# Patient Record
Sex: FEMALE | Race: WHITE | NOT HISPANIC OR LATINO | Employment: FULL TIME | URBAN - METROPOLITAN AREA
[De-identification: names, ages, dates, MRNs, and addresses within clinical notes are randomized per-mention and may not be internally consistent; named-entity substitution may affect disease eponyms.]

---

## 2023-09-07 ENCOUNTER — HOSPITAL ENCOUNTER (OUTPATIENT)
Dept: DATA CONVERSION | Facility: HOSPITAL | Age: 33
Discharge: HOME | End: 2023-09-07
Payer: COMMERCIAL

## 2023-09-07 DIAGNOSIS — Z11.3 ENCOUNTER FOR SCREENING FOR INFECTIONS WITH A PREDOMINANTLY SEXUAL MODE OF TRANSMISSION: ICD-10-CM

## 2023-09-07 DIAGNOSIS — Z34.81 ENCOUNTER FOR SUPERVISION OF OTHER NORMAL PREGNANCY, FIRST TRIMESTER (HHS-HCC): ICD-10-CM

## 2023-09-07 LAB
C TRACH RRNA SPEC QL NAA+PROBE: NORMAL
DRUG SCREEN COMMENT UR-IMP: NORMAL
N GONORRHOEA RRNA SPEC QL NAA+PROBE: NORMAL
SPECIMEN SOURCE: NORMAL

## 2023-10-13 ENCOUNTER — LAB (OUTPATIENT)
Dept: LAB | Facility: LAB | Age: 33
End: 2023-10-13
Payer: COMMERCIAL

## 2023-10-13 DIAGNOSIS — Z13.79 ENCOUNTER FOR OTHER SCREENING FOR GENETIC AND CHROMOSOMAL ANOMALIES: Primary | ICD-10-CM

## 2023-10-13 DIAGNOSIS — Z11.3 ENCOUNTER FOR SCREENING FOR INFECTIONS WITH A PREDOMINANTLY SEXUAL MODE OF TRANSMISSION: ICD-10-CM

## 2023-10-13 DIAGNOSIS — Z34.81 ENCOUNTER FOR SUPERVISION OF OTHER NORMAL PREGNANCY, FIRST TRIMESTER (HHS-HCC): ICD-10-CM

## 2023-10-13 LAB
ABO GROUP (TYPE) IN BLOOD: NORMAL
ANTIBODY SCREEN: NORMAL
ERYTHROCYTE [DISTWIDTH] IN BLOOD BY AUTOMATED COUNT: 12.9 % (ref 11.5–14.5)
EST. AVERAGE GLUCOSE BLD GHB EST-MCNC: 94 MG/DL
HBA1C MFR BLD: 4.9 %
HCT VFR BLD AUTO: 40.2 % (ref 36–46)
HGB BLD-MCNC: 12.8 G/DL (ref 12–16)
MCH RBC QN AUTO: 30.2 PG (ref 26–34)
MCHC RBC AUTO-ENTMCNC: 31.8 G/DL (ref 32–36)
MCV RBC AUTO: 95 FL (ref 80–100)
NRBC BLD-RTO: 0 /100 WBCS (ref 0–0)
PLATELET # BLD AUTO: 295 X10*3/UL (ref 150–450)
PMV BLD AUTO: 9.9 FL (ref 7.5–11.5)
RBC # BLD AUTO: 4.24 X10*6/UL (ref 4–5.2)
RH FACTOR (ANTIGEN D): NORMAL
WBC # BLD AUTO: 7 X10*3/UL (ref 4.4–11.3)

## 2023-10-13 PROCEDURE — 86317 IMMUNOASSAY INFECTIOUS AGENT: CPT

## 2023-10-13 PROCEDURE — 83036 HEMOGLOBIN GLYCOSYLATED A1C: CPT

## 2023-10-13 PROCEDURE — 85027 COMPLETE CBC AUTOMATED: CPT

## 2023-10-13 PROCEDURE — 86780 TREPONEMA PALLIDUM: CPT

## 2023-10-13 PROCEDURE — 36415 COLL VENOUS BLD VENIPUNCTURE: CPT

## 2023-10-13 PROCEDURE — 86850 RBC ANTIBODY SCREEN: CPT

## 2023-10-13 PROCEDURE — 87340 HEPATITIS B SURFACE AG IA: CPT

## 2023-10-13 PROCEDURE — 87389 HIV-1 AG W/HIV-1&-2 AB AG IA: CPT

## 2023-10-13 PROCEDURE — 86900 BLOOD TYPING SEROLOGIC ABO: CPT

## 2023-10-13 PROCEDURE — 86901 BLOOD TYPING SEROLOGIC RH(D): CPT

## 2023-10-13 PROCEDURE — 86803 HEPATITIS C AB TEST: CPT

## 2023-10-14 LAB
HBV SURFACE AG SERPL QL IA: NONREACTIVE
HCV AB SER QL: NONREACTIVE
HIV 1+2 AB+HIV1 P24 AG SERPL QL IA: NONREACTIVE
RUBV IGG SERPL IA-ACNC: 1.3 IA
RUBV IGG SERPL QL IA: POSITIVE
T PALLIDUM AB SER QL: NONREACTIVE

## 2023-12-30 ENCOUNTER — LAB (OUTPATIENT)
Dept: LAB | Facility: LAB | Age: 33
End: 2023-12-30
Payer: COMMERCIAL

## 2023-12-30 DIAGNOSIS — Z34.82 ENCOUNTER FOR SUPERVISION OF OTHER NORMAL PREGNANCY, SECOND TRIMESTER (HHS-HCC): Primary | ICD-10-CM

## 2023-12-30 LAB
ERYTHROCYTE [DISTWIDTH] IN BLOOD BY AUTOMATED COUNT: 13.2 % (ref 11.5–14.5)
GLUCOSE 1H P 50 G GLC PO SERPL-MCNC: 152 MG/DL
HCT VFR BLD AUTO: 33.3 % (ref 36–46)
HGB BLD-MCNC: 10.2 G/DL (ref 12–16)
MCH RBC QN AUTO: 26.6 PG (ref 26–34)
MCHC RBC AUTO-ENTMCNC: 30.6 G/DL (ref 32–36)
MCV RBC AUTO: 87 FL (ref 80–100)
NRBC BLD-RTO: 0 /100 WBCS (ref 0–0)
PLATELET # BLD AUTO: 254 X10*3/UL (ref 150–450)
RBC # BLD AUTO: 3.83 X10*6/UL (ref 4–5.2)
WBC # BLD AUTO: 8.1 X10*3/UL (ref 4.4–11.3)

## 2023-12-30 PROCEDURE — 85027 COMPLETE CBC AUTOMATED: CPT

## 2023-12-30 PROCEDURE — 36415 COLL VENOUS BLD VENIPUNCTURE: CPT

## 2023-12-30 PROCEDURE — 82947 ASSAY GLUCOSE BLOOD QUANT: CPT

## 2024-01-05 DIAGNOSIS — R63.0 ANOREXIA: ICD-10-CM

## 2024-01-05 DIAGNOSIS — E86.0 DEHYDRATION: ICD-10-CM

## 2024-01-05 DIAGNOSIS — Z34.83 ENCOUNTER FOR SUPERVISION OF OTHER NORMAL PREGNANCY, THIRD TRIMESTER (HHS-HCC): Primary | ICD-10-CM

## 2024-01-06 ENCOUNTER — LAB (OUTPATIENT)
Dept: LAB | Facility: LAB | Age: 34
End: 2024-01-06
Payer: COMMERCIAL

## 2024-01-06 DIAGNOSIS — Z34.83 ENCOUNTER FOR SUPERVISION OF OTHER NORMAL PREGNANCY, THIRD TRIMESTER (HHS-HCC): ICD-10-CM

## 2024-01-06 DIAGNOSIS — R63.0 ANOREXIA: ICD-10-CM

## 2024-01-06 DIAGNOSIS — E86.0 DEHYDRATION: ICD-10-CM

## 2024-01-06 LAB
ALBUMIN SERPL BCP-MCNC: 3.4 G/DL (ref 3.4–5)
ALP SERPL-CCNC: 84 U/L (ref 33–110)
ALT SERPL W P-5'-P-CCNC: 8 U/L (ref 7–45)
AMYLASE SERPL-CCNC: 39 U/L (ref 29–103)
ANION GAP SERPL CALC-SCNC: 14 MMOL/L (ref 10–20)
AST SERPL W P-5'-P-CCNC: 12 U/L (ref 9–39)
BILIRUB SERPL-MCNC: 0.3 MG/DL (ref 0–1.2)
BUN SERPL-MCNC: 3 MG/DL (ref 6–23)
CALCIUM SERPL-MCNC: 8.5 MG/DL (ref 8.6–10.3)
CHLORIDE SERPL-SCNC: 104 MMOL/L (ref 98–107)
CO2 SERPL-SCNC: 25 MMOL/L (ref 21–32)
CREAT SERPL-MCNC: 0.47 MG/DL (ref 0.5–1.05)
GFR SERPL CREATININE-BSD FRML MDRD: >90 ML/MIN/1.73M*2
GLUCOSE 1H P 100 G GLC PO SERPL-MCNC: 158 MG/DL
GLUCOSE 2H P 100 G GLC PO SERPL-MCNC: 139 MG/DL
GLUCOSE 3H P 100 G GLC PO SERPL-MCNC: 110 MG/DL
GLUCOSE P FAST SERPL-MCNC: 90 MG/DL
GLUCOSE SERPL-MCNC: 90 MG/DL (ref 74–99)
LIPASE SERPL-CCNC: 18 U/L (ref 9–82)
POTASSIUM SERPL-SCNC: 3.8 MMOL/L (ref 3.5–5.3)
PROT SERPL-MCNC: 5.9 G/DL (ref 6.4–8.2)
SODIUM SERPL-SCNC: 139 MMOL/L (ref 136–145)
TSH SERPL-ACNC: 1.07 MIU/L (ref 0.44–3.98)

## 2024-01-06 PROCEDURE — 80053 COMPREHEN METABOLIC PANEL: CPT

## 2024-01-06 PROCEDURE — 82951 GLUCOSE TOLERANCE TEST (GTT): CPT

## 2024-01-06 PROCEDURE — 82947 ASSAY GLUCOSE BLOOD QUANT: CPT

## 2024-01-06 PROCEDURE — 82952 GTT-ADDED SAMPLES: CPT

## 2024-01-06 PROCEDURE — 82950 GLUCOSE TEST: CPT

## 2024-01-06 PROCEDURE — 36415 COLL VENOUS BLD VENIPUNCTURE: CPT

## 2024-01-06 PROCEDURE — 82150 ASSAY OF AMYLASE: CPT

## 2024-01-06 PROCEDURE — 84443 ASSAY THYROID STIM HORMONE: CPT

## 2024-01-06 PROCEDURE — 83690 ASSAY OF LIPASE: CPT

## 2024-01-15 ENCOUNTER — ANCILLARY PROCEDURE (OUTPATIENT)
Dept: RADIOLOGY | Facility: CLINIC | Age: 34
End: 2024-01-15
Payer: COMMERCIAL

## 2024-01-15 DIAGNOSIS — R10.11 RIGHT UPPER QUADRANT PAIN: ICD-10-CM

## 2024-01-15 PROCEDURE — 76705 ECHO EXAM OF ABDOMEN: CPT

## 2024-02-29 ENCOUNTER — HOSPITAL ENCOUNTER (INPATIENT)
Facility: HOSPITAL | Age: 34
End: 2024-02-29
Attending: OBSTETRICS & GYNECOLOGY | Admitting: OBSTETRICS & GYNECOLOGY
Payer: COMMERCIAL

## 2024-02-29 ENCOUNTER — HOSPITAL ENCOUNTER (INPATIENT)
Facility: HOSPITAL | Age: 34
LOS: 3 days | Discharge: HOME | End: 2024-03-03
Attending: STUDENT IN AN ORGANIZED HEALTH CARE EDUCATION/TRAINING PROGRAM | Admitting: STUDENT IN AN ORGANIZED HEALTH CARE EDUCATION/TRAINING PROGRAM
Payer: COMMERCIAL

## 2024-02-29 ENCOUNTER — HOSPITAL ENCOUNTER (OUTPATIENT)
Facility: HOSPITAL | Age: 34
Discharge: HOME | End: 2024-02-29
Attending: STUDENT IN AN ORGANIZED HEALTH CARE EDUCATION/TRAINING PROGRAM | Admitting: STUDENT IN AN ORGANIZED HEALTH CARE EDUCATION/TRAINING PROGRAM
Payer: COMMERCIAL

## 2024-02-29 VITALS
DIASTOLIC BLOOD PRESSURE: 76 MMHG | TEMPERATURE: 98.2 F | WEIGHT: 160 LBS | SYSTOLIC BLOOD PRESSURE: 131 MMHG | HEART RATE: 130 BPM | RESPIRATION RATE: 18 BRPM | OXYGEN SATURATION: 97 % | HEIGHT: 59 IN | BODY MASS INDEX: 32.25 KG/M2

## 2024-02-29 DIAGNOSIS — M79.89 LEG SWELLING: Primary | ICD-10-CM

## 2024-02-29 DIAGNOSIS — O12.00: ICD-10-CM

## 2024-02-29 LAB
ABO GROUP (TYPE) IN BLOOD: NORMAL
ANTIBODY SCREEN: NORMAL
ERYTHROCYTE [DISTWIDTH] IN BLOOD BY AUTOMATED COUNT: 16.4 % (ref 11.5–14.5)
HCT VFR BLD AUTO: 29.7 % (ref 36–46)
HGB BLD-MCNC: 9 G/DL (ref 12–16)
HOLD SPECIMEN: NORMAL
MCH RBC QN AUTO: 22 PG (ref 26–34)
MCHC RBC AUTO-ENTMCNC: 30.3 G/DL (ref 32–36)
MCV RBC AUTO: 72 FL (ref 80–100)
NRBC BLD-RTO: 0 /100 WBCS (ref 0–0)
PLATELET # BLD AUTO: 218 X10*3/UL (ref 150–450)
RBC # BLD AUTO: 4.1 X10*6/UL (ref 4–5.2)
RH FACTOR (ANTIGEN D): NORMAL
WBC # BLD AUTO: 9.9 X10*3/UL (ref 4.4–11.3)

## 2024-02-29 PROCEDURE — 86780 TREPONEMA PALLIDUM: CPT | Mod: TRILAB,WESLAB | Performed by: STUDENT IN AN ORGANIZED HEALTH CARE EDUCATION/TRAINING PROGRAM

## 2024-02-29 PROCEDURE — 99213 OFFICE O/P EST LOW 20 MIN: CPT

## 2024-02-29 PROCEDURE — 87086 URINE CULTURE/COLONY COUNT: CPT | Mod: TRILAB,WESLAB | Performed by: STUDENT IN AN ORGANIZED HEALTH CARE EDUCATION/TRAINING PROGRAM

## 2024-02-29 PROCEDURE — 86901 BLOOD TYPING SEROLOGIC RH(D): CPT | Performed by: STUDENT IN AN ORGANIZED HEALTH CARE EDUCATION/TRAINING PROGRAM

## 2024-02-29 PROCEDURE — 2500000004 HC RX 250 GENERAL PHARMACY W/ HCPCS (ALT 636 FOR OP/ED): Performed by: STUDENT IN AN ORGANIZED HEALTH CARE EDUCATION/TRAINING PROGRAM

## 2024-02-29 PROCEDURE — 85027 COMPLETE CBC AUTOMATED: CPT | Performed by: STUDENT IN AN ORGANIZED HEALTH CARE EDUCATION/TRAINING PROGRAM

## 2024-02-29 PROCEDURE — 1220000001 HC OB SEMI-PRIVATE ROOM DAILY

## 2024-02-29 PROCEDURE — 87081 CULTURE SCREEN ONLY: CPT | Mod: TRILAB,WESLAB | Performed by: STUDENT IN AN ORGANIZED HEALTH CARE EDUCATION/TRAINING PROGRAM

## 2024-02-29 PROCEDURE — 36415 COLL VENOUS BLD VENIPUNCTURE: CPT | Performed by: STUDENT IN AN ORGANIZED HEALTH CARE EDUCATION/TRAINING PROGRAM

## 2024-02-29 PROCEDURE — 36415 COLL VENOUS BLD VENIPUNCTURE: CPT

## 2024-02-29 PROCEDURE — 99215 OFFICE O/P EST HI 40 MIN: CPT

## 2024-02-29 RX ORDER — METOCLOPRAMIDE 10 MG/1
10 TABLET ORAL EVERY 6 HOURS PRN
Status: DISCONTINUED | OUTPATIENT
Start: 2024-02-29 | End: 2024-03-01

## 2024-02-29 RX ORDER — ONDANSETRON HYDROCHLORIDE 2 MG/ML
4 INJECTION, SOLUTION INTRAVENOUS EVERY 6 HOURS PRN
Status: DISCONTINUED | OUTPATIENT
Start: 2024-02-29 | End: 2024-02-29 | Stop reason: HOSPADM

## 2024-02-29 RX ORDER — METOCLOPRAMIDE HYDROCHLORIDE 5 MG/ML
10 INJECTION INTRAMUSCULAR; INTRAVENOUS EVERY 6 HOURS PRN
Status: DISCONTINUED | OUTPATIENT
Start: 2024-02-29 | End: 2024-03-01

## 2024-02-29 RX ORDER — SODIUM CHLORIDE, SODIUM LACTATE, POTASSIUM CHLORIDE, CALCIUM CHLORIDE 600; 310; 30; 20 MG/100ML; MG/100ML; MG/100ML; MG/100ML
125 INJECTION, SOLUTION INTRAVENOUS CONTINUOUS
Status: DISCONTINUED | OUTPATIENT
Start: 2024-02-29 | End: 2024-03-01

## 2024-02-29 RX ORDER — LIDOCAINE HYDROCHLORIDE 10 MG/ML
30 INJECTION INFILTRATION; PERINEURAL ONCE AS NEEDED
Status: DISCONTINUED | OUTPATIENT
Start: 2024-02-29 | End: 2024-03-01 | Stop reason: HOSPADM

## 2024-02-29 RX ORDER — OXYTOCIN 10 [USP'U]/ML
10 INJECTION, SOLUTION INTRAMUSCULAR; INTRAVENOUS ONCE AS NEEDED
Status: DISCONTINUED | OUTPATIENT
Start: 2024-02-29 | End: 2024-03-01 | Stop reason: HOSPADM

## 2024-02-29 RX ORDER — LOPERAMIDE HYDROCHLORIDE 2 MG/1
4 CAPSULE ORAL EVERY 2 HOUR PRN
Status: DISCONTINUED | OUTPATIENT
Start: 2024-02-29 | End: 2024-03-01 | Stop reason: HOSPADM

## 2024-02-29 RX ORDER — FOLIC ACID 1 MG/1
TABLET ORAL DAILY
COMMUNITY

## 2024-02-29 RX ORDER — LIDOCAINE HYDROCHLORIDE 10 MG/ML
0.5 INJECTION INFILTRATION; PERINEURAL ONCE AS NEEDED
Status: DISCONTINUED | OUTPATIENT
Start: 2024-02-29 | End: 2024-02-29 | Stop reason: HOSPADM

## 2024-02-29 RX ORDER — ONDANSETRON 4 MG/1
4 TABLET, FILM COATED ORAL EVERY 6 HOURS PRN
Status: DISCONTINUED | OUTPATIENT
Start: 2024-02-29 | End: 2024-03-01

## 2024-02-29 RX ORDER — OXYTOCIN/0.9 % SODIUM CHLORIDE 30/500 ML
60 PLASTIC BAG, INJECTION (ML) INTRAVENOUS ONCE AS NEEDED
Status: COMPLETED | OUTPATIENT
Start: 2024-02-29 | End: 2024-03-01

## 2024-02-29 RX ORDER — MISOPROSTOL 200 UG/1
800 TABLET ORAL ONCE AS NEEDED
Status: DISCONTINUED | OUTPATIENT
Start: 2024-02-29 | End: 2024-03-01 | Stop reason: HOSPADM

## 2024-02-29 RX ORDER — PENICILLIN G 3000000 [IU]/50ML
3 INJECTION, SOLUTION INTRAVENOUS EVERY 4 HOURS
Status: DISCONTINUED | OUTPATIENT
Start: 2024-03-01 | End: 2024-03-01

## 2024-02-29 RX ORDER — ONDANSETRON HYDROCHLORIDE 2 MG/ML
4 INJECTION, SOLUTION INTRAVENOUS EVERY 6 HOURS PRN
Status: DISCONTINUED | OUTPATIENT
Start: 2024-02-29 | End: 2024-03-01

## 2024-02-29 RX ORDER — NITROFURANTOIN 25; 75 MG/1; MG/1
CAPSULE ORAL 2 TIMES DAILY
COMMUNITY

## 2024-02-29 RX ORDER — HYDRALAZINE HYDROCHLORIDE 20 MG/ML
5 INJECTION INTRAMUSCULAR; INTRAVENOUS ONCE AS NEEDED
Status: DISCONTINUED | OUTPATIENT
Start: 2024-02-29 | End: 2024-03-01 | Stop reason: HOSPADM

## 2024-02-29 RX ORDER — OMEPRAZOLE 40 MG/1
40 CAPSULE, DELAYED RELEASE ORAL
COMMUNITY

## 2024-02-29 RX ORDER — NIFEDIPINE 10 MG/1
10 CAPSULE ORAL ONCE AS NEEDED
Status: DISCONTINUED | OUTPATIENT
Start: 2024-02-29 | End: 2024-02-29 | Stop reason: HOSPADM

## 2024-02-29 RX ORDER — GABAPENTIN 400 MG/1
400 CAPSULE ORAL DAILY
COMMUNITY

## 2024-02-29 RX ORDER — ONDANSETRON 4 MG/1
4 TABLET, FILM COATED ORAL EVERY 6 HOURS PRN
Status: DISCONTINUED | OUTPATIENT
Start: 2024-02-29 | End: 2024-02-29 | Stop reason: HOSPADM

## 2024-02-29 RX ORDER — METHYLERGONOVINE MALEATE 0.2 MG/ML
0.2 INJECTION INTRAVENOUS ONCE AS NEEDED
Status: DISCONTINUED | OUTPATIENT
Start: 2024-02-29 | End: 2024-03-01 | Stop reason: HOSPADM

## 2024-02-29 RX ORDER — LABETALOL HYDROCHLORIDE 5 MG/ML
20 INJECTION, SOLUTION INTRAVENOUS ONCE AS NEEDED
Status: DISCONTINUED | OUTPATIENT
Start: 2024-02-29 | End: 2024-02-29 | Stop reason: HOSPADM

## 2024-02-29 RX ORDER — TERBUTALINE SULFATE 1 MG/ML
0.25 INJECTION SUBCUTANEOUS ONCE AS NEEDED
Status: DISCONTINUED | OUTPATIENT
Start: 2024-02-29 | End: 2024-03-01 | Stop reason: HOSPADM

## 2024-02-29 RX ORDER — NIFEDIPINE 10 MG/1
10 CAPSULE ORAL ONCE AS NEEDED
Status: DISCONTINUED | OUTPATIENT
Start: 2024-02-29 | End: 2024-03-01 | Stop reason: HOSPADM

## 2024-02-29 RX ORDER — CARBOPROST TROMETHAMINE 250 UG/ML
250 INJECTION, SOLUTION INTRAMUSCULAR ONCE AS NEEDED
Status: DISCONTINUED | OUTPATIENT
Start: 2024-02-29 | End: 2024-03-01 | Stop reason: HOSPADM

## 2024-02-29 RX ORDER — HYDRALAZINE HYDROCHLORIDE 20 MG/ML
5 INJECTION INTRAMUSCULAR; INTRAVENOUS ONCE AS NEEDED
Status: DISCONTINUED | OUTPATIENT
Start: 2024-02-29 | End: 2024-02-29 | Stop reason: HOSPADM

## 2024-02-29 RX ORDER — LABETALOL HYDROCHLORIDE 5 MG/ML
20 INJECTION, SOLUTION INTRAVENOUS ONCE AS NEEDED
Status: DISCONTINUED | OUTPATIENT
Start: 2024-02-29 | End: 2024-03-01 | Stop reason: HOSPADM

## 2024-02-29 RX ORDER — TRANEXAMIC ACID 100 MG/ML
1000 INJECTION, SOLUTION INTRAVENOUS ONCE AS NEEDED
Status: DISCONTINUED | OUTPATIENT
Start: 2024-02-29 | End: 2024-03-01 | Stop reason: HOSPADM

## 2024-02-29 RX ADMIN — DEXTROSE MONOHYDRATE 5 MILLION UNITS: 5 INJECTION INTRAVENOUS at 22:56

## 2024-02-29 ASSESSMENT — ACTIVITIES OF DAILY LIVING (ADL)
WALKS_IN_HOME: INDEPENDENTLY
ADL_BEFORE_ADMISSION: INDEPENDENTLY
HOW_WELL_CAN_YOU_BATHE_YOURSELF: INDEPENDENTLY
ADEQUATE_TO_COMPLETE_ADL: YES
HOW_WELL_CAN_YOU_DRESS_YOURSELF: INDEPENDENTLY
TOILETING: INDEPENDENT
ADEQUATE_TO_COMPLETE_ADL: YES
HEARING_RIGHT_EAR: NO PROBLEMS
HOW_WELL_CAN_YOU_USE_BATHROOM_BY_YOURSELF: INDEPENDENTLY
HOW_WELL_CAN_YOU_FEED_YOURSELF: INDEPENDENTLY
HOW_WELL_CAN_YOU_COMPLETE_GROOMING_TASKS: INDEPENDENTLY
FEEDING: INDEPENDENT
BATHING: INDEPENDENT
ADL_BEFORE_ADMISSION: RIGHT
DRESSING: INDEPENDENT
HEARING_LEFT_EAR: NO PROBLEMS

## 2024-02-29 ASSESSMENT — PAIN SCALES - GENERAL
PAINLEVEL_OUTOF10: 4
PAINLEVEL_OUTOF10: 5 - MODERATE PAIN
PAINLEVEL_OUTOF10: 0 - NO PAIN
PAINLEVEL_OUTOF10: 5 - MODERATE PAIN

## 2024-02-29 NOTE — H&P
"Obstetrical Admission History and Physical     Shirley Arizmendi is a 33 y.o. . At 34+2 c/o LOF    Chief Complaint: Rupture of Membranes (Pt arrives to labor and delivery in triage bed 2, states \"I felt a gush of fluid about 20 minutes ago\". Pt denies any vaginal bleeding. Pt reports positive fetal movement. Pt states \"I started having back pain and some cramping 4/10 about 20 minutes ago\". )    Assessment/Plan    D/w pt no s/sx of SROM on exam.   No symptoms of UTI but will send urine culture due to leukocytes on urine sample.  D/w pt s/sx of labor and srom.  Okay for discharge home but pt aware to call back if any changes in status    Active Problems:  There are no active Hospital Problems.      Pregnancy Problems (from 22 to present)       No problems associated with this episode.          Subjective   Shirley is here complaining of LOF.  Had a large gush, no leaking since. Good FM.  No regular contractions but lower back pain.   No change in urination.  No VB     Obstetrical History   OB History    Para Term  AB Living   6 5 5     5   SAB IAB Ectopic Multiple Live Births           5      # Outcome Date GA Lbr Juan C/2nd Weight Sex Delivery Anes PTL Lv   6 Current            5 Term            4 Term            3 Term            2 Term            1 Term                Past Medical History  Past Medical History:   Diagnosis Date    Other conditions influencing health status     No significant past surgical history        Past Surgical History   No past surgical history on file.    Social History  Social History     Tobacco Use    Smoking status: Not on file    Smokeless tobacco: Not on file   Substance Use Topics    Alcohol use: Not on file     Substance and Sexual Activity   Drug Use Not on file       Allergies  Latex, natural rubber and Sulfa (sulfonamide antibiotics)     Medications  Medications Prior to Admission   Medication Sig Dispense Refill Last Dose    folic acid (Folvite) 1 mg tablet " Take by mouth once daily.    at 0930    gabapentin (Neurontin) 400 mg capsule Take 1 capsule (400 mg) by mouth 3 times a day.   2/29/2024 at 0930    levomilnacipran (Fetzima) 120 mg extended release capsule Take 1 capsule (120 mg) by mouth once daily.   2/29/2024 at 0930    nitrofurantoin, macrocrystal-monohydrate, (Macrobid) 100 mg capsule Take by mouth 2 times a day.   2/29/2024 at 0930    omeprazole (PriLOSEC) 40 mg DR capsule Take 1 capsule (40 mg) by mouth. Do not crush or chew.    at 0930       Objective    Last Vitals  Temp Pulse Resp BP MAP O2 Sat   36.8 °C (98.2 °F) (!) 130 18 131/76   97 %     Physical Examination  FHR is 150 , with  , and a   tracing.    [facility] reading:  irritability  CERVIX: closed/long/high, no pooling, neg nitrazine, neg ferning    Lab Review  Labs in chart were reviewed.

## 2024-03-01 ENCOUNTER — ANESTHESIA EVENT (OUTPATIENT)
Dept: OBSTETRICS AND GYNECOLOGY | Facility: HOSPITAL | Age: 34
End: 2024-03-01
Payer: COMMERCIAL

## 2024-03-01 ENCOUNTER — ANESTHESIA (OUTPATIENT)
Dept: OBSTETRICS AND GYNECOLOGY | Facility: HOSPITAL | Age: 34
End: 2024-03-01
Payer: COMMERCIAL

## 2024-03-01 PROBLEM — F41.9 ANXIETY: Status: ACTIVE | Noted: 2024-03-01

## 2024-03-01 PROBLEM — F32.A DEPRESSION: Status: ACTIVE | Noted: 2024-03-01

## 2024-03-01 PROBLEM — Z86.69 HISTORY OF MIGRAINE HEADACHES: Status: ACTIVE | Noted: 2024-03-01

## 2024-03-01 LAB
ABO GROUP (TYPE) IN BLOOD: NORMAL
ABO GROUP (TYPE) IN BLOOD: NORMAL
ANTIBODY SCREEN: NORMAL
BACTERIA UR CULT: NO GROWTH
BASE EXCESS BLDCOA CALC-SCNC: -6 MMOL/L (ref -10.8–-0.5)
BASE EXCESS BLDCOV CALC-SCNC: -3.9 MMOL/L (ref -8.1–-0.5)
BODY TEMPERATURE: 37 DEGREES CELSIUS
BODY TEMPERATURE: 37 DEGREES CELSIUS
HCO3 BLDCOA-SCNC: 23.9 MMOL/L (ref 15–29)
HCO3 BLDCOV-SCNC: 22.2 MMOL/L (ref 16–26)
INHALED O2 CONCENTRATION: 21 %
INHALED O2 CONCENTRATION: 21 %
OXYHGB MFR BLDCOA: 53 % (ref 94–98)
OXYHGB MFR BLDCOV: 64.2 % (ref 94–98)
PCO2 BLDCOA: 67 MM HG (ref 31–75)
PCO2 BLDCOV: 43 MM HG (ref 22–53)
PH BLDCOA: 7.16 PH (ref 7.08–7.39)
PH BLDCOV: 7.32 PH (ref 7.19–7.47)
PO2 BLDCOA: 34 MM HG (ref 5–31)
PO2 BLDCOV: 33 MM HG (ref 13–37)
RH FACTOR (ANTIGEN D): NORMAL
RH FACTOR (ANTIGEN D): NORMAL
SAO2 % BLDCOA: 54 % (ref 3–69)
SAO2 % BLDCOV: 66 % (ref 16–84)
TREPONEMA PALLIDUM IGG+IGM AB [PRESENCE] IN SERUM OR PLASMA BY IMMUNOASSAY: NONREACTIVE

## 2024-03-01 PROCEDURE — 01967 NEURAXL LBR ANES VAG DLVR: CPT | Performed by: ANESTHESIOLOGIST ASSISTANT

## 2024-03-01 PROCEDURE — 7210000002 HC LABOR PER HOUR

## 2024-03-01 PROCEDURE — 59409 OBSTETRICAL CARE: CPT

## 2024-03-01 PROCEDURE — 2500000001 HC RX 250 WO HCPCS SELF ADMINISTERED DRUGS (ALT 637 FOR MEDICARE OP)

## 2024-03-01 PROCEDURE — 82805 BLOOD GASES W/O2 SATURATION: CPT | Performed by: STUDENT IN AN ORGANIZED HEALTH CARE EDUCATION/TRAINING PROGRAM

## 2024-03-01 PROCEDURE — 2500000005 HC RX 250 GENERAL PHARMACY W/O HCPCS: Performed by: ANESTHESIOLOGIST ASSISTANT

## 2024-03-01 PROCEDURE — 1210000001 HC SEMI-PRIVATE ROOM DAILY

## 2024-03-01 PROCEDURE — 01967 NEURAXL LBR ANES VAG DLVR: CPT | Performed by: STUDENT IN AN ORGANIZED HEALTH CARE EDUCATION/TRAINING PROGRAM

## 2024-03-01 PROCEDURE — 7100000016 HC LABOR RECOVERY PER HOUR

## 2024-03-01 PROCEDURE — 59409 OBSTETRICAL CARE: CPT | Performed by: STUDENT IN AN ORGANIZED HEALTH CARE EDUCATION/TRAINING PROGRAM

## 2024-03-01 PROCEDURE — 2500000004 HC RX 250 GENERAL PHARMACY W/ HCPCS (ALT 636 FOR OP/ED)

## 2024-03-01 PROCEDURE — 86901 BLOOD TYPING SEROLOGIC RH(D): CPT

## 2024-03-01 PROCEDURE — 88307 TISSUE EXAM BY PATHOLOGIST: CPT | Mod: TC,SUR

## 2024-03-01 PROCEDURE — 51701 INSERT BLADDER CATHETER: CPT

## 2024-03-01 PROCEDURE — 2500000005 HC RX 250 GENERAL PHARMACY W/O HCPCS

## 2024-03-01 PROCEDURE — 2500000004 HC RX 250 GENERAL PHARMACY W/ HCPCS (ALT 636 FOR OP/ED): Performed by: STUDENT IN AN ORGANIZED HEALTH CARE EDUCATION/TRAINING PROGRAM

## 2024-03-01 PROCEDURE — 36415 COLL VENOUS BLD VENIPUNCTURE: CPT

## 2024-03-01 PROCEDURE — 88307 TISSUE EXAM BY PATHOLOGIST: CPT | Performed by: PATHOLOGY

## 2024-03-01 PROCEDURE — 86920 COMPATIBILITY TEST SPIN: CPT

## 2024-03-01 RX ORDER — OXYTOCIN/0.9 % SODIUM CHLORIDE 30/500 ML
2-30 PLASTIC BAG, INJECTION (ML) INTRAVENOUS CONTINUOUS
Status: DISCONTINUED | OUTPATIENT
Start: 2024-03-01 | End: 2024-03-01

## 2024-03-01 RX ORDER — ADHESIVE BANDAGE
10 BANDAGE TOPICAL
Status: DISCONTINUED | OUTPATIENT
Start: 2024-03-01 | End: 2024-03-03 | Stop reason: HOSPADM

## 2024-03-01 RX ORDER — METHYLERGONOVINE MALEATE 0.2 MG/ML
0.2 INJECTION INTRAVENOUS ONCE AS NEEDED
Status: DISCONTINUED | OUTPATIENT
Start: 2024-03-01 | End: 2024-03-03 | Stop reason: HOSPADM

## 2024-03-01 RX ORDER — DIPHENHYDRAMINE HCL 25 MG
25 CAPSULE ORAL EVERY 6 HOURS PRN
Status: DISCONTINUED | OUTPATIENT
Start: 2024-03-01 | End: 2024-03-03 | Stop reason: HOSPADM

## 2024-03-01 RX ORDER — HYDRALAZINE HYDROCHLORIDE 20 MG/ML
5 INJECTION INTRAMUSCULAR; INTRAVENOUS ONCE AS NEEDED
Status: DISCONTINUED | OUTPATIENT
Start: 2024-03-01 | End: 2024-03-03 | Stop reason: HOSPADM

## 2024-03-01 RX ORDER — BETAMETHASONE SODIUM PHOSPHATE AND BETAMETHASONE ACETATE 3; 3 MG/ML; MG/ML
12 INJECTION, SUSPENSION INTRA-ARTICULAR; INTRALESIONAL; INTRAMUSCULAR; SOFT TISSUE EVERY 24 HOURS
Status: DISCONTINUED | OUTPATIENT
Start: 2024-03-01 | End: 2024-03-01

## 2024-03-01 RX ORDER — CARBOPROST TROMETHAMINE 250 UG/ML
250 INJECTION, SOLUTION INTRAMUSCULAR ONCE AS NEEDED
Status: DISCONTINUED | OUTPATIENT
Start: 2024-03-01 | End: 2024-03-03 | Stop reason: HOSPADM

## 2024-03-01 RX ORDER — IBUPROFEN 600 MG/1
600 TABLET ORAL EVERY 6 HOURS
Status: DISCONTINUED | OUTPATIENT
Start: 2024-03-01 | End: 2024-03-03 | Stop reason: HOSPADM

## 2024-03-01 RX ORDER — OXYTOCIN 10 [USP'U]/ML
10 INJECTION, SOLUTION INTRAMUSCULAR; INTRAVENOUS ONCE AS NEEDED
Status: DISCONTINUED | OUTPATIENT
Start: 2024-03-01 | End: 2024-03-03 | Stop reason: HOSPADM

## 2024-03-01 RX ORDER — LOPERAMIDE HYDROCHLORIDE 2 MG/1
4 CAPSULE ORAL EVERY 2 HOUR PRN
Status: DISCONTINUED | OUTPATIENT
Start: 2024-03-01 | End: 2024-03-03 | Stop reason: HOSPADM

## 2024-03-01 RX ORDER — NIFEDIPINE 10 MG/1
10 CAPSULE ORAL ONCE AS NEEDED
Status: DISCONTINUED | OUTPATIENT
Start: 2024-03-01 | End: 2024-03-03 | Stop reason: HOSPADM

## 2024-03-01 RX ORDER — FENTANYL/BUPIVACAINE/NS/PF 2MCG/ML-.1
PLASTIC BAG, INJECTION (ML) INJECTION AS NEEDED
Status: DISCONTINUED | OUTPATIENT
Start: 2024-03-01 | End: 2024-03-01

## 2024-03-01 RX ORDER — DIPHENHYDRAMINE HYDROCHLORIDE 50 MG/ML
25 INJECTION INTRAMUSCULAR; INTRAVENOUS EVERY 6 HOURS PRN
Status: DISCONTINUED | OUTPATIENT
Start: 2024-03-01 | End: 2024-03-03 | Stop reason: HOSPADM

## 2024-03-01 RX ORDER — OXYTOCIN/0.9 % SODIUM CHLORIDE 30/500 ML
60 PLASTIC BAG, INJECTION (ML) INTRAVENOUS ONCE AS NEEDED
Status: DISCONTINUED | OUTPATIENT
Start: 2024-03-01 | End: 2024-03-03 | Stop reason: HOSPADM

## 2024-03-01 RX ORDER — ONDANSETRON 4 MG/1
4 TABLET, FILM COATED ORAL EVERY 6 HOURS PRN
Status: DISCONTINUED | OUTPATIENT
Start: 2024-03-01 | End: 2024-03-03 | Stop reason: HOSPADM

## 2024-03-01 RX ORDER — LABETALOL HYDROCHLORIDE 5 MG/ML
20 INJECTION, SOLUTION INTRAVENOUS ONCE AS NEEDED
Status: DISCONTINUED | OUTPATIENT
Start: 2024-03-01 | End: 2024-03-03 | Stop reason: HOSPADM

## 2024-03-01 RX ORDER — ENOXAPARIN SODIUM 100 MG/ML
40 INJECTION SUBCUTANEOUS EVERY 24 HOURS
Status: DISCONTINUED | OUTPATIENT
Start: 2024-03-02 | End: 2024-03-03 | Stop reason: HOSPADM

## 2024-03-01 RX ORDER — SIMETHICONE 80 MG
80 TABLET,CHEWABLE ORAL 4 TIMES DAILY PRN
Status: DISCONTINUED | OUTPATIENT
Start: 2024-03-01 | End: 2024-03-03 | Stop reason: HOSPADM

## 2024-03-01 RX ORDER — POLYETHYLENE GLYCOL 3350 17 G/17G
17 POWDER, FOR SOLUTION ORAL 2 TIMES DAILY PRN
Status: DISCONTINUED | OUTPATIENT
Start: 2024-03-01 | End: 2024-03-03 | Stop reason: HOSPADM

## 2024-03-01 RX ORDER — TRANEXAMIC ACID 100 MG/ML
1000 INJECTION, SOLUTION INTRAVENOUS ONCE AS NEEDED
Status: DISCONTINUED | OUTPATIENT
Start: 2024-03-01 | End: 2024-03-03 | Stop reason: HOSPADM

## 2024-03-01 RX ORDER — MISOPROSTOL 200 UG/1
800 TABLET ORAL ONCE AS NEEDED
Status: DISCONTINUED | OUTPATIENT
Start: 2024-03-01 | End: 2024-03-03 | Stop reason: HOSPADM

## 2024-03-01 RX ORDER — ONDANSETRON HYDROCHLORIDE 2 MG/ML
4 INJECTION, SOLUTION INTRAVENOUS EVERY 6 HOURS PRN
Status: DISCONTINUED | OUTPATIENT
Start: 2024-03-01 | End: 2024-03-03 | Stop reason: HOSPADM

## 2024-03-01 RX ORDER — LIDOCAINE 560 MG/1
1 PATCH PERCUTANEOUS; TOPICAL; TRANSDERMAL
Status: DISCONTINUED | OUTPATIENT
Start: 2024-03-01 | End: 2024-03-03 | Stop reason: HOSPADM

## 2024-03-01 RX ORDER — BISACODYL 10 MG/1
10 SUPPOSITORY RECTAL DAILY PRN
Status: DISCONTINUED | OUTPATIENT
Start: 2024-03-01 | End: 2024-03-03 | Stop reason: HOSPADM

## 2024-03-01 RX ORDER — PENICILLIN G 3000000 [IU]/50ML
3 INJECTION, SOLUTION INTRAVENOUS EVERY 4 HOURS
Status: DISCONTINUED | OUTPATIENT
Start: 2024-03-01 | End: 2024-03-01

## 2024-03-01 RX ORDER — ACETAMINOPHEN 325 MG/1
975 TABLET ORAL EVERY 6 HOURS
Status: DISCONTINUED | OUTPATIENT
Start: 2024-03-01 | End: 2024-03-03 | Stop reason: HOSPADM

## 2024-03-01 RX ADMIN — BETAMETHASONE ACETATE AND BETAMETHASONE SODIUM PHOSPHATE 12 MG: 3; 3 INJECTION, SUSPENSION INTRA-ARTICULAR; INTRALESIONAL; INTRAMUSCULAR; SOFT TISSUE at 02:32

## 2024-03-01 RX ADMIN — SODIUM CHLORIDE, POTASSIUM CHLORIDE, SODIUM LACTATE AND CALCIUM CHLORIDE 125 ML/HR: 600; 310; 30; 20 INJECTION, SOLUTION INTRAVENOUS at 02:20

## 2024-03-01 RX ADMIN — Medication 5 ML: at 04:15

## 2024-03-01 RX ADMIN — SODIUM CHLORIDE, POTASSIUM CHLORIDE, SODIUM LACTATE AND CALCIUM CHLORIDE 500 ML: 600; 310; 30; 20 INJECTION, SOLUTION INTRAVENOUS at 04:30

## 2024-03-01 RX ADMIN — Medication 2 MILLI-UNITS/MIN: at 02:59

## 2024-03-01 RX ADMIN — Medication 60 MILLI-UNITS/MIN: at 12:13

## 2024-03-01 RX ADMIN — PENICILLIN G 3 MILLION UNITS: 3000000 INJECTION, SOLUTION INTRAVENOUS at 10:58

## 2024-03-01 RX ADMIN — LIDOCAINE HYDROCHLORIDE 3 ML: 10; .005 INJECTION, SOLUTION EPIDURAL; INFILTRATION; INTRACAUDAL; PERINEURAL at 10:05

## 2024-03-01 RX ADMIN — Medication 5 ML: at 04:20

## 2024-03-01 RX ADMIN — Medication 14 ML/HR: at 04:23

## 2024-03-01 RX ADMIN — PENICILLIN G 3 MILLION UNITS: 3000000 INJECTION, SOLUTION INTRAVENOUS at 06:54

## 2024-03-01 RX ADMIN — IBUPROFEN 600 MG: 600 TABLET, FILM COATED ORAL at 12:51

## 2024-03-01 RX ADMIN — ACETAMINOPHEN 975 MG: 325 TABLET ORAL at 12:51

## 2024-03-01 RX ADMIN — PENICILLIN G POTASSIUM 5 MILLION UNITS: 5000000 INJECTION, POWDER, FOR SOLUTION INTRAMUSCULAR; INTRAVENOUS at 02:50

## 2024-03-01 RX ADMIN — IBUPROFEN 600 MG: 600 TABLET, FILM COATED ORAL at 18:30

## 2024-03-01 RX ADMIN — ACETAMINOPHEN 975 MG: 325 TABLET ORAL at 18:30

## 2024-03-01 SDOH — HEALTH STABILITY: MENTAL HEALTH: CURRENT SMOKER: 0

## 2024-03-01 SDOH — ECONOMIC STABILITY: FOOD INSECURITY: WITHIN THE PAST 12 MONTHS, YOU WORRIED THAT YOUR FOOD WOULD RUN OUT BEFORE YOU GOT MONEY TO BUY MORE.: NEVER TRUE

## 2024-03-01 SDOH — HEALTH STABILITY: MENTAL HEALTH: HAVE YOU USED ANY SUBSTANCES (CANABIS, COCAINE, HEROIN, HALLUCINOGENS, INHALANTS, ETC.) IN THE PAST 12 MONTHS?: NO

## 2024-03-01 SDOH — ECONOMIC STABILITY: FOOD INSECURITY: WITHIN THE PAST 12 MONTHS, YOU WORRIED THAT YOUR FOOD WOULD RUN OUT BEFORE YOU GOT THE MONEY TO BUY MORE.: NEVER TRUE

## 2024-03-01 SDOH — ECONOMIC STABILITY: HOUSING INSECURITY: DO YOU FEEL UNSAFE GOING BACK TO THE PLACE WHERE YOU ARE LIVING?: NO

## 2024-03-01 SDOH — ECONOMIC STABILITY: TRANSPORTATION INSECURITY

## 2024-03-01 SDOH — SOCIAL STABILITY: SOCIAL INSECURITY: DOES ANYONE TRY TO KEEP YOU FROM HAVING/CONTACTING OTHER FRIENDS OR DOING THINGS OUTSIDE YOUR HOME?: NO

## 2024-03-01 SDOH — HEALTH STABILITY: MENTAL HEALTH: WISH TO BE DEAD (PAST 1 MONTH): NO

## 2024-03-01 SDOH — SOCIAL STABILITY: SOCIAL INSECURITY: ABUSE SCREEN: ADULT

## 2024-03-01 SDOH — ECONOMIC STABILITY: FOOD INSECURITY: WITHIN THE PAST 12 MONTHS, THE FOOD YOU BOUGHT JUST DIDN’T LAST AND YOU DIDN’T HAVE MONEY TO GET MORE.: NEVER TRUE

## 2024-03-01 SDOH — ECONOMIC STABILITY: FOOD INSECURITY: WITHIN THE PAST 12 MONTHS, THE FOOD YOU BOUGHT JUST DIDN'T LAST AND YOU DIDN'T HAVE MONEY TO GET MORE.: NEVER TRUE

## 2024-03-01 SDOH — SOCIAL STABILITY: SOCIAL INSECURITY: HAS ANYONE EVER THREATENED TO HURT YOUR FAMILY OR YOUR PETS?: NO

## 2024-03-01 SDOH — ECONOMIC STABILITY: FOOD INSECURITY

## 2024-03-01 SDOH — SOCIAL STABILITY: SOCIAL INSECURITY: HAVE YOU HAD THOUGHTS OF HARMING ANYONE ELSE?: NO

## 2024-03-01 SDOH — SOCIAL STABILITY: SOCIAL INSECURITY: PHYSICAL ABUSE: DENIES

## 2024-03-01 SDOH — HEALTH STABILITY: MENTAL HEALTH: SUICIDAL BEHAVIOR (LIFETIME): NO

## 2024-03-01 SDOH — ECONOMIC STABILITY: TRANSPORTATION INSECURITY
IN THE PAST 12 MONTHS, HAS THE LACK OF TRANSPORTATION KEPT YOU FROM MEDICAL APPOINTMENTS OR FROM GETTING MEDICATIONS?: NO

## 2024-03-01 SDOH — SOCIAL STABILITY: SOCIAL INSECURITY: ARE THERE ANY APPARENT SIGNS OF INJURIES/BEHAVIORS THAT COULD BE RELATED TO ABUSE/NEGLECT?: NO

## 2024-03-01 SDOH — ECONOMIC STABILITY: TRANSPORTATION INSECURITY: IN THE PAST 12 MONTHS, HAS LACK OF TRANSPORTATION KEPT YOU FROM MEDICAL APPOINTMENTS OR FROM GETTING MEDICATIONS?: NO

## 2024-03-01 SDOH — SOCIAL STABILITY: SOCIAL INSECURITY: ARE YOU OR HAVE YOU BEEN THREATENED OR ABUSED PHYSICALLY, EMOTIONALLY, OR SEXUALLY BY ANYONE?: NO

## 2024-03-01 SDOH — ECONOMIC STABILITY: TRANSPORTATION INSECURITY
IN THE PAST 12 MONTHS, HAS LACK OF TRANSPORTATION KEPT YOU FROM MEETINGS, WORK, OR FROM GETTING THINGS NEEDED FOR DAILY LIVING?: NO

## 2024-03-01 SDOH — SOCIAL STABILITY: SOCIAL INSECURITY: VERBAL ABUSE: DENIES

## 2024-03-01 SDOH — SOCIAL STABILITY: SOCIAL INSECURITY: DO YOU FEEL ANYONE HAS EXPLOITED OR TAKEN ADVANTAGE OF YOU FINANCIALLY OR OF YOUR PERSONAL PROPERTY?: NO

## 2024-03-01 SDOH — HEALTH STABILITY: MENTAL HEALTH: HAVE YOU USED ANY PRESCRIPTION DRUGS OTHER THAN PRESCRIBED IN THE PAST 12 MONTHS?: NO

## 2024-03-01 SDOH — HEALTH STABILITY: MENTAL HEALTH: NON-SPECIFIC ACTIVE SUICIDAL THOUGHTS (PAST 1 MONTH): NO

## 2024-03-01 ASSESSMENT — PAIN SCALES - GENERAL
PAINLEVEL_OUTOF10: 0 - NO PAIN
PAINLEVEL_OUTOF10: 8
PAINLEVEL_OUTOF10: 0 - NO PAIN
PAINLEVEL_OUTOF10: 7
PAINLEVEL_OUTOF10: 0 - NO PAIN
PAINLEVEL_OUTOF10: 7
PAINLEVEL_OUTOF10: 0 - NO PAIN
PAINLEVEL_OUTOF10: 0 - NO PAIN
PAINLEVEL_OUTOF10: 6
PAINLEVEL_OUTOF10: 0 - NO PAIN
PAINLEVEL_OUTOF10: 0 - NO PAIN
PAINLEVEL_OUTOF10: 6
PAINLEVEL_OUTOF10: 0 - NO PAIN
PAINLEVEL_OUTOF10: 6
PAINLEVEL_OUTOF10: 0 - NO PAIN

## 2024-03-01 ASSESSMENT — LIFESTYLE VARIABLES
HOW OFTEN DO YOU HAVE A DRINK CONTAINING ALCOHOL: NEVER
SKIP TO QUESTIONS 9-10: 1
HOW MANY STANDARD DRINKS CONTAINING ALCOHOL DO YOU HAVE ON A TYPICAL DAY: PATIENT DOES NOT DRINK
AUDIT-C TOTAL SCORE: 0
HOW OFTEN DO YOU HAVE 6 OR MORE DRINKS ON ONE OCCASION: NEVER
AUDIT-C TOTAL SCORE: 0

## 2024-03-01 ASSESSMENT — ACTIVITIES OF DAILY LIVING (ADL)
LACK_OF_TRANSPORTATION: NO
LACK_OF_TRANSPORTATION: NO

## 2024-03-01 ASSESSMENT — PAIN DESCRIPTION - DESCRIPTORS: DESCRIPTORS: CRAMPING

## 2024-03-01 ASSESSMENT — PATIENT HEALTH QUESTIONNAIRE - PHQ9
2. FEELING DOWN, DEPRESSED OR HOPELESS: NOT AT ALL
1. LITTLE INTEREST OR PLEASURE IN DOING THINGS: NOT AT ALL
SUM OF ALL RESPONSES TO PHQ9 QUESTIONS 1 & 2: 0

## 2024-03-01 NOTE — H&P
Obstetrical Admission History and Physical     Shirley Arizmendi is a 33 y.o. .     Chief Complaint: Leakage/Loss of Fluid    Assessment/Plan    34yo  presenting as transfer from [facility] for  SROM.      SROM  - SROMd  evening approx . SSE pos x3 at OSH. On transfer, grossly ruptured, positive nitrazine and pooling.  - Mild intermittent ctx started after SROM. Cervix changed  -> 50/-3 from OSH to [facility]  - GBS pending from [facility]  - Cont PCN for GBS unk  - BMZ for fetal lung maturity, and for 2nd dose in 24 hrs if still pregnant  - Cephalic on BSUS  - Discussed with patient recommendation for delivery after 34.0wa if ROM occurs due to risks of prolonged ROM including but not limited to infection. Pt is amenable to plan for delivery as well as plan for pitocin augmentation as needed.  - Delivery plan: patient desires vaginal delivery, patient counseled on risks of labor, vaginal delivery, and possibility of C/S for varying maternal or fetal indications  - NICU aware.     Anemia  - Hb 9.0 prior to transfer. MCV 72.  - T&C x1u pRBC    Anxiety/depression  - Follows w [facility].   - Cont home meds: Gabapentin 400mg TID and fetzima 120mg XR daily.     Fetal status  - CEFM; Cat 1 currently  - BSUS EFW 2070g 11%    Pt seen and d/w Dr. Sherman Angeles MD PGY-2        Principal Problem:     premature rupture of membranes (PPROM) with onset of labor after 24 hours of rupture in third trimester, antepartum  Active Problems:    Anxiety    Depression    History of migraine headaches        Subjective   34yo  presenting as transfer from [facility] for SROM at 34.2wga (). Reports LOF that started  evening. No VB. Mild intermittent ctx. GFM. At [facility], pos x3 on speculum exam for ROM. Also noted to be grossly ruptured. /-2. CBC with Hb 9.0, MCV 72. Notes continued LOF since presentation to [facility].    Preg n/f:  - Anemia, Hb 9.0 prior to transfer. MCV 72.  -  Anxiety/depression: Follows [facility]. Gabapentin 400mg TID and fetzima 120mg XR daily. States mood is stable.  - Cholelithiasis: seen on RU US in 2024 done for symptoms, no sign of acute cholecystitis  - On macrobid suppression for recurrent UTI  - Migraine headaches    Obstetrical History   OB History    Para Term  AB Living   6 5 5     5   SAB IAB Ectopic Multiple Live Births           5      # Outcome Date GA Lbr Juan C/2nd Weight Sex Delivery Anes PTL Lv   6 Current            5 Term            4 Term            3 Term            2 Term            1 Term                Past Medical History  Past Medical History:   Diagnosis Date    Other conditions influencing health status     No significant past surgical history        Past Surgical History   No past surgical history on file.    Social History  Social History     Tobacco Use    Smoking status: Not on file    Smokeless tobacco: Not on file   Substance Use Topics    Alcohol use: Not on file     Substance and Sexual Activity   Drug Use Not on file       Allergies  Latex, natural rubber and Sulfa (sulfonamide antibiotics)     Medications  Medications Prior to Admission   Medication Sig Dispense Refill Last Dose    folic acid (Folvite) 1 mg tablet Take by mouth once daily.   3/1/2024    gabapentin (Neurontin) 400 mg capsule Take 1 capsule (400 mg) by mouth 3 times a day.   3/1/2024    levomilnacipran (Fetzima) 120 mg extended release capsule Take 1 capsule (120 mg) by mouth once daily.   3/1/2024    nitrofurantoin, macrocrystal-monohydrate, (Macrobid) 100 mg capsule Take by mouth 2 times a day.   3/1/2024    omeprazole (PriLOSEC) 40 mg DR capsule Take 1 capsule (40 mg) by mouth. Do not crush or chew.   3/1/2024       Objective    Last Vitals  Temp Pulse Resp BP MAP O2 Sat   37.3 °C (99.1 °F) (!) 128   138/78   99 %     Physical Examination  Constitutional: No visible distress, alert and cooperative  Respiratory/Thorax: Normal respiratory  "effort on RA  Cardiovascular: Reg rate  Gastrointestinal: soft, nondistended, nontender, gravid  Neurological: A&Ox3  Psychological: Appropriate mood and behavior    SVE: 4/50/-3  cEFM: Baseline 150, pos accels, neg decels, mod jaiden  Ctx: irregular    BSUS: EFW 2070g 11%. Cephalic presentation.    Lab Review  Lab Results   Component Value Date    WBC 9.9 02/29/2024    HGB 9.0 (L) 02/29/2024    HCT 29.7 (L) 02/29/2024     02/29/2024     No results found for: \"GRPBSTREP\"    "

## 2024-03-01 NOTE — ANESTHESIA PREPROCEDURE EVALUATION
Patient: Shirley Arizmendi    Evaluation Method: In-person visit    Procedure Information    Date: 03/01/24  Procedure: Labor Analgesia         Relevant Problems   Anesthesia  Doesn't remember if she had GA for wisdom teeth.  Epidurals x 5 w/o complications      Cardiovascular (within normal limits)      Endocrine (within normal limits)      GI (within normal limits)      /Renal (within normal limits)      Neuro/Psych  Taking meds   (+) Anxiety   (+) Depression   (+) History of migraine headaches      Pulmonary (within normal limits)      GI/Hepatic (within normal limits)      Hematology (within normal limits)      Musculoskeletal (within normal limits)      Eyes, Ears, Nose, and Throat (within normal limits)       Clinical information reviewed:    Allergies  Meds               NPO Detail:  NPO/Void Status  Date of Last Liquid: 02/29/24  Time of Last Liquid: 1500  Date of Last Solid: 02/29/24  Time of Last Solid: 1500         OB/Gyn Evaluation    Present Pregnancy    Patient is pregnant now.   Obstetric History                Physical Exam    Airway  Mallampati: III  TM distance: >3 FB  Neck ROM: full     Cardiovascular    Dental   Comments: Tongue ring and nose ring present, pt. Advised that they have to be taken out in case of OR procedure   Pulmonary    Abdominal          Vitals:    03/01/24 0354   BP:    Pulse: (!) 130   Temp:    SpO2: 99%        Anesthesia Plan    History of general anesthesia?: no  History of complications of general anesthesia?: unknown/emergency    ASA 2     epidural     The patient is not a current smoker.    Anesthetic plan and risks discussed with patient.    Plan discussed with CAA.

## 2024-03-01 NOTE — PROGRESS NOTES
Intrapartum Progress Note    Assessment/Plan   Shirley Arizmendi is a 33 y.o.  at 34w3d admitted for  SROM     SROM  - SROMd  evening approx . SSE pos x3 at OSH. On transfer, grossly ruptured, positive nitrazine and pooling.  - Cx changed from 1 -> 4cm   - Continue pitocin per protocol  - PPBC: declines     Anemia  - Hb 9.0 prior to transfer. MCV 72.  - T&C x1u pRBC     Anxiety/depression  - Follows w [facility].   - Cont home meds: Gabapentin 400mg TID and fetzima 120mg XR daily.      Fetal status  - CEFM; Cat 1 currently  - BSUS EFW 2070g 11%  - [facility]  - BMZ for fetal lung maturity, and for 2nd dose in 24 hrs if still pregnant  - NICU aware.      To be Discussed with Dr. Huan Breaux MD, PGY-1      Subjective   Feeling more pressure, would like SVE    Objective   Last Vitals:  Temp Pulse Resp BP MAP Pulse Ox   37.3 °C (99.1 °F) (!) 122 16 124/83   100 %     Vitals Min/Max Last 24 Hours:  Temp  Min: 36.4 °C (97.5 °F)  Max: 37.3 °C (99.1 °F)  Pulse  Min: 105  Max: 140  Resp  Min: 16  Max: 18  BP  Min: 114/71  Max: 149/88    Intake/Output:    Intake/Output Summary (Last 24 hours) at 3/1/2024 0756  Last data filed at 3/1/2024 0752  Gross per 24 hour   Intake --   Output 600 ml   Net -600 ml       Physical Examination:  General: Lying in bed in NAD  Skin: No rashes/lesions/erythema  Neuro: Awake, alert, conversational  CV: Regular rate  Respiratory: Even and unlabored on RA  Extremities: No edema, discoloration, or pain in BLE  Psych: appropriate mood and affect    Cervical Exam  Dilation: 4  Effacement (%): 60  Fetal Station: -3  Method: Manual  OB Examiner: Saeid MOLINA  Fetal Assessment  Movement: Present  Mode: External US  Baseline Fetal Heart Rate (bpm): 140 bpm  Baseline Classification: Normal  Variability: Moderate (Between 6 and 25 BPM)  Pattern: Accelerations  FHR Category: Category I  Multiple Births: No      Contraction Frequency:  1-4.5      Lab Review:  Lab Results   Component Value Date    WBC 9.9 02/29/2024    HGB 9.0 (L) 02/29/2024    HCT 29.7 (L) 02/29/2024     02/29/2024

## 2024-03-01 NOTE — CARE PLAN
The patient's goals for the shift include    Problem: Skin  Goal: Prevent/manage excess moisture  Outcome: Progressing     Problem: Skin  Goal: Prevent/minimize sheer/friction injuries  Outcome: Progressing       The clinical goals for the shift include      Over the shift, the patient did not make progress toward the following goals. Barriers to progression include none. Recommendations to address these barriers include none.

## 2024-03-01 NOTE — ANESTHESIA PROCEDURE NOTES
Epidural Block    Patient location during procedure: OB  Start time: 3/1/2024 4:05 AM  Reason for block: labor analgesia  Staffing  Performed: MANJU   Authorized by: Stef Atkinson MD    Performed by: MANJU Hopkins    Preanesthetic Checklist  Completed: patient identified, IV checked, risks and benefits discussed, surgical consent, pre-op evaluation, timeout performed and sterile techniques followed  Block Timeout  RN/Licensed healthcare professional reads aloud to the Anesthesia provider and entire team: Patient identity, procedure with side and site, patient position, and as applicable the availability of implants/special equipment/special requirements.    Timeout performed at: 3/1/2024 4:05 AM  Block Placement  Patient position: sitting  Prep: ChloraPrep  Sterility prep: cap, drape, mask and gloves  Sedation level: no sedation  Patient monitoring: heart rate and blood pressure  Approach: midline  Local numbing: lidocaine 1% to skin and subcutaneous tissues  Vertebral space: lumbar  Lumbar location: L3-L4  Epidural  Loss of resistance technique: saline  Guidance: landmark technique        Needle  Needle type: Tuohy   Needle gauge: 17  Needle length: 8.9cm  Needle insertion depth: 6 cm  Catheter at skin depth: 10.5 cm  Catheter securement method: clear occlusive dressing    Test dose: lidocaine 1.5% with epinephrine 1-to-200,000  Test dose: lidocaine 1.5% with epinephrine 1-to-200,000  Test dose result: no positive test dose    PCEA  Medication concentration used: 0.044% Bupivacaine with 1.25 mcg/mL Fentanyl and 1:080015 Epinephrine  Dose (mL): 10  Lockout (minutes): 15  1-Hour Limit (boluses/hr): 4  Basal Rate: 14        Assessment  Sensory level: T10 bilateral  Block outcome: pain improved  Number of attempts: 1  Events: no positive test dose  Procedure assessment: patient tolerated procedure well with no immediate complications

## 2024-03-01 NOTE — CARE PLAN
The patient's goals for the shift include   Problem: Vaginal Birth or  Section  Goal: Prevention of malpresentation/labor dystocia through delivery  Outcome: Progressing     Problem: Vaginal Birth or  Section  Goal: Demonstrates labor coping techniques through delivery  Outcome: Progressing     Problem: Vaginal Birth or  Section  Goal: Minimal s/sx of HDP and BP<160/110  Outcome: Progressing     Problem: Vaginal Birth or  Section  Goal: No s/sx of infection through recovery  Outcome: Progressing     Problem: Vaginal Birth or  Section  Goal: No s/sx of hemorrhage through recovery  Outcome: Progressing     Problem: Postpartum  Goal: Experiences normal postpartum course  Outcome: Progressing        The clinical goals for the shift include      Over the shift, the patient did not make progress toward the following goals. Barriers to progression include none. Recommendations to address these barriers include none.

## 2024-03-01 NOTE — DISCHARGE INSTRUCTIONS
Any woman can have complications after a birth including a blood clot, a heart problem, hypertensive disorder/eclampsia, depression, hemorrhage, or infection. Notify all providers of your delivery date up to one year after birth.*       Call 911 or go to nearest emergency room right away if you have:   PAIN or pressure in chest;   OBSTRUCTED breathing or shortness of breath;   SEIZURES;   THOUGHTS of hurting yourself or someone else; heart palpitations/racing; change in alertness/confusion.    Call your provider if you have:   BLEEDING, soaking through a pad/hour, or blood clots the size of an egg or bigger;   INCISION (episiotomy stitches or  site) that is not healing (increased redness, pain, drainage/pus, or separation) if you had one;   RED or swollen leg/calf that is painful or warm to touch, especially in one leg more than the other;   TEMPERATURE of 100.4 F or higher or chills;   HEADACHE that does not get better with medicine, rest or hydration, or bad headache with vision changes   like spots or flashing lights; increased swelling of face, hands or legs; severe cramps or upper right belly pain; red or swollen breast that is painful or warm to touch; an unusual, foul odor from your vaginal discharge; pain, burning, or difficulty during urination; severe constipation (more than 5 days); feelings of depression (such as depressed mood, loss of interest in enjoyable things, unable to care for yourself, trouble sleeping, lack of appetite, or feeling worthless).     If you can't reach your provider or symptoms worsen, call 911 or go to nearest emergency room.   *Information obtained from [employer]: Save Your Life: Get Care for These POST-BIRTH Warning Signs

## 2024-03-01 NOTE — L&D DELIVERY NOTE
OB Delivery Note  3/1/2024  Shirley Arizmendi  33 y.o.   Vaginal, Spontaneous       of infant and placenta,  mL    Gestational Age: 34w3d  /Para:   EBL from delivery: 100 mL  Quantitative Blood Loss: Admission to Discharge: 144 mL (2024 10:11 PM - 3/1/2024  1:21 PM)    Magali Arizmendi [[id number]]      Labor Events    Sac identifier: Sac 1  Rupture type:  Prelabor  Fluid color: Clear  Fluid odor: None  Labor type: Spontaneous Onset of Labor  Labor allowed to proceed with plans for an attempted vaginal birth?: Yes  Augmentation: Oxytocin  Augmentation date/time: 2024 0259  Complications: None       Labor Event Times    Labor onset date/time: 3/1/2024 120  Dilation complete date/time: 3/1/2024 1203  Start pushing date/time: 3/1/2024 1205       Labor Length    1st stage: 0h 02m  2nd stage: 0h 05m  3rd stage: 0h 06m       Placenta    Placenta delivery date/time: 3/1/2024 1214  Placenta removal: Spontaneous  Placenta appearance: Intact  Placenta disposition: pathology       Cord    Vessels: 3 vessels  Complications: Nuchal  Nuchal intervention: reduced  Nuchal cord description: loose nuchal cord  Number of loops: 1  Cord blood disposition: Lab  Gases sent?: Yes  Stem cell collection (by provider): No       Lacerations    Episiotomy: None  Perineal laceration: None  Other lacerations?: No  Repair suture: None       Anesthesia    Method: Epidural       Operative Delivery    Forceps attempted?: No  Vacuum extractor attempted?: No       Shoulder Dystocia    Shoulder dystocia present?: No       [place] Delivery    Birth date/time: 3/1/2024 12:08:00  Delivery type: Vaginal, Spontaneous  Complications: None       Resuscitation    Method: None       Apgars    Living status: Living  Apgar Component Scores:  1 min.:  5 min.:  10 min.:  15 min.:  20 min.:    Skin color:  0  1       Heart rate:  2  2       Reflex irritability:  2  2       Muscle tone:  2  2       Respiratory effort:  2  2        Total:  8  9       Apgars assigned by: MACRUS MELENDEZ/ CHANG MOLINA       Delivery Providers    Delivering clinician: Aggie Pressley MD   Provider Role    Soheila Hassan RN Delivery Nurse    Monique Melendez, FILEMON Nursery Nurse    Monica Breaux MD Resident                 Monica Breaux MD PGY1

## 2024-03-01 NOTE — SIGNIFICANT EVENT
Patient resting in bed. Getting more uncomfortable with contractions     Cervical Exam  Dilation: 5  Effacement (%): 80  Fetal Station: -3  Method: Manual  OB Examiner: Saeid MOLINA  Fetal Assessment  Movement: Present  Mode: External US  Baseline Fetal Heart Rate (bpm): 135 bpm  Baseline Classification: Normal  Variability: Moderate (Between 6 and 25 BPM)  Pattern: Accelerations, Variable decelerations  Pattern Observations: Monitor reading moms heart rate, RN at bedside readjusting and FHT at 155 bpm  FHR Category: Category I  Multiple Births: No      Contraction Frequency: 2-6    A/P:     Labor  - s/p SROM  - Titrate pitocin per protocol  - Epidural infusing. Anesthesia at bedside for pain control  - CEFM; Cat 1 currently  - GBS pending, PCN     To be Discussed with Dr. Huan Breaux MD, PGY-1

## 2024-03-01 NOTE — H&P
Obstetrical Admission History and Physical     Shirley Arizmendi is a 33 y.o.  at 34+2 presents after LOF    Chief Complaint: Leakage/Loss of Fluid    Assessment/Plan    Admit for PPROM  IV PCN for GBS unknown, GBS swab done  D/w pt transfer to [facility] due to gestational age under 35 wks, pt agrees with plan of care    Active Problems:  There are no active Hospital Problems.      Pregnancy Problems (from 24 to present)       No problems associated with this episode.          Subjective   Shirley is here complaining of LOF.  Pt was seen earlier today after small LOF at approximately 1130 am and exam was negative for rupture of membranes. Pt went home and around  she had large gush of fluid and is still leaking.  Clear fluid.  No VB. +cramping, good FM.  Pregnancy complicated by anxiety and migraines.  Pt desires sterilization after delivery.  Pregnancy also complicated by gallstones seen on RUQ US in 2024.  Pt on macrobid daily for chronic UTIs.     Obstetrical History   OB History    Para Term  AB Living   6 5 5     5   SAB IAB Ectopic Multiple Live Births           5      # Outcome Date GA Lbr Juan C/2nd Weight Sex Delivery Anes PTL Lv   6 Current            5 Term            4 Term            3 Term            2 Term            1 Term                Past Medical History  Past Medical History:   Diagnosis Date    Other conditions influencing health status     No significant past surgical history        Past Surgical History   No past surgical history on file.    Social History  Social History     Tobacco Use    Smoking status: Not on file    Smokeless tobacco: Not on file   Substance Use Topics    Alcohol use: Not on file     Substance and Sexual Activity   Drug Use Not on file       Allergies  Latex, natural rubber and Sulfa (sulfonamide antibiotics)     Medications  Medications Prior to Admission   Medication Sig Dispense Refill Last Dose    folic acid (Folvite) 1 mg tablet Take by mouth  once daily.       gabapentin (Neurontin) 400 mg capsule Take 1 capsule (400 mg) by mouth 3 times a day.       levomilnacipran (Fetzima) 120 mg extended release capsule Take 1 capsule (120 mg) by mouth once daily.       nitrofurantoin, macrocrystal-monohydrate, (Macrobid) 100 mg capsule Take by mouth 2 times a day.       omeprazole (PriLOSEC) 40 mg DR capsule Take 1 capsule (40 mg) by mouth. Do not crush or chew.          Objective    Last Vitals  Temp Pulse Resp BP MAP O2 Sat   36.7 °C (98.1 °F) (!) 121   135/88   97 %     Physical Examination  GENERAL: Examination reveals a well developed, well nourished, gravid female in no acute distress. She is alert and cooperative.  ABDOMEN: soft, gravid, nontender, nondistended, no abnormal masses, no epigastric pain  FHR is  160, with Accelerations, and a Category I tracing.    [place] reading:  irregular  CERVIX: 1 cm dilated, 50 % effaced, -2 station; MEMBRANES are SROM  SSE--grossly ruptured, +pooling, +nitrazine, +ferning  EXTREMITIES: no redness or tenderness in the calves or thighs, no edema  Bedside US--confirms vertex position    Lab Review  Labs in chart were reviewed.

## 2024-03-02 PROCEDURE — 2500000001 HC RX 250 WO HCPCS SELF ADMINISTERED DRUGS (ALT 637 FOR MEDICARE OP)

## 2024-03-02 PROCEDURE — 1210000001 HC SEMI-PRIVATE ROOM DAILY

## 2024-03-02 PROCEDURE — 2500000004 HC RX 250 GENERAL PHARMACY W/ HCPCS (ALT 636 FOR OP/ED)

## 2024-03-02 RX ADMIN — ACETAMINOPHEN 975 MG: 325 TABLET ORAL at 18:49

## 2024-03-02 RX ADMIN — ACETAMINOPHEN 975 MG: 325 TABLET ORAL at 12:18

## 2024-03-02 RX ADMIN — ACETAMINOPHEN 975 MG: 325 TABLET ORAL at 00:41

## 2024-03-02 RX ADMIN — ENOXAPARIN SODIUM 40 MG: 100 INJECTION SUBCUTANEOUS at 00:42

## 2024-03-02 RX ADMIN — IBUPROFEN 600 MG: 600 TABLET, FILM COATED ORAL at 12:17

## 2024-03-02 RX ADMIN — ACETAMINOPHEN 975 MG: 325 TABLET ORAL at 06:34

## 2024-03-02 RX ADMIN — IBUPROFEN 600 MG: 600 TABLET, FILM COATED ORAL at 06:34

## 2024-03-02 RX ADMIN — IBUPROFEN 600 MG: 600 TABLET, FILM COATED ORAL at 18:49

## 2024-03-02 RX ADMIN — IBUPROFEN 600 MG: 600 TABLET, FILM COATED ORAL at 00:42

## 2024-03-02 ASSESSMENT — PAIN SCALES - GENERAL
PAINLEVEL_OUTOF10: 0 - NO PAIN
PAINLEVEL_OUTOF10: 7
PAINLEVEL_OUTOF10: 3
PAINLEVEL_OUTOF10: 4
PAINLEVEL_OUTOF10: 0 - NO PAIN
PAINLEVEL_OUTOF10: 0 - NO PAIN
PAINLEVEL_OUTOF10: 2
PAINLEVEL_OUTOF10: 5 - MODERATE PAIN
PAINLEVEL_OUTOF10: 0 - NO PAIN

## 2024-03-02 ASSESSMENT — PAIN - FUNCTIONAL ASSESSMENT
PAIN_FUNCTIONAL_ASSESSMENT: 0-10

## 2024-03-02 ASSESSMENT — PAIN DESCRIPTION - LOCATION: LOCATION: ABDOMEN

## 2024-03-02 NOTE — PROGRESS NOTES
Postpartum Progress Note      Assessment/Plan     Shirley Arizmendi is a 33 y.o.,  initially presented for  PPROM  She had a Vaginal, Spontaneous   delivery on 3/1/2024  at 34w3d and is now postpartum day 1.    #Postpartum  - continue routine postpartum care  - pain well controlled on po medications  - DVT Score: 5 ; SCDs and enoxaparin prophylactic dose daily while inpatient  - The patient's blood type is O POS. The baby's blood type is O POS . Rhogam is not indicated.    #Maternal Well-Being  - emotional support provided  - bonding with infant  - Contraception: Defers contraception to primary OB/PP visit. We discussed pregnancy spacing of at least one year, abstaining from intercourse for 6wks, and the ability to become pregnant in the absence of regular menses. Pt verbalized understanding.     # Feeding  - breastfeeding/pumping encouraged; lactation consult prn    #Dispo  - anticipate d/c on PPD #2 if continuing to meet all postpartum milestones  - for f/u 4-6 weeks with Primary OB provider      Principal Problem:     premature rupture of membranes (PPROM) with onset of labor after 24 hours of rupture in third trimester, antepartum  Active Problems:    Anxiety    Depression    History of migraine headaches       Subjective     Meeting all postpartum milestones- ambulating independently, passing flatus, tolerating PO intake, lochia light, voiding spontaneously, and pain well controlled with PO meds.    Objective   Physical Exam:  General: well appearing, well nourished, postpartum  Obstetric: fundus firm below umbilicus, lochia light  Skin: Warm, dry; no rashes/lesions/erythema  Breast: No masses, nipple discharge  Neuro: A/Ox3, no gross motor deficit   GI: no distension, appropriately tender, soft, +BS  Respiratory: Even and unlabored on RA, LSCTA BL  Cardiovascular: 3+ BLE edema; No erythema, warmth  Psych: appropriate mood and affect, conversational    Last Vitals:  Temp Pulse Resp BP MAP Pulse  Ox   36.8 °C (98.2 °F) 107 18 120/79   98 %       Vitals Min/Max Last 24 Hours:  Temp  Min: 36.1 °C (97 °F)  Max: 36.8 °C (98.2 °F)  Pulse  Min: 98  Max: 122  Resp  Min: 16  Max: 20  BP  Min: 120/79  Max: 156/93    Lab Data:  Lab Results   Component Value Date    WBC 9.9 02/29/2024    HGB 9.0 (L) 02/29/2024    HCT 29.7 (L) 02/29/2024     02/29/2024

## 2024-03-02 NOTE — CARE PLAN
Problem: Postpartum  Goal: Experiences normal postpartum course  Outcome: Progressing  Goal: Appropriate maternal -  bonding  Outcome: Progressing  Goal: Establish and maintain infant feeding pattern for adequate nutrition  Outcome: Progressing  Goal: Incisions, wounds, or drain sites healing without S/S of infection  Outcome: Progressing  Goal: No s/sx infection  Outcome: Progressing  Goal: No s/sx of hemorrhage  Outcome: Progressing  Goal: Minimal s/sx of HDP and BP<160/110  Outcome: Progressing     Problem: Skin  Goal: Decreased wound size/increased tissue granulation at next dressing change  Outcome: Progressing  Goal: Participates in plan/prevention/treatment measures  Outcome: Progressing  Goal: Prevent/manage excess moisture  Outcome: Progressing  Goal: Prevent/minimize sheer/friction injuries  Outcome: Progressing  Goal: Promote/optimize nutrition  Outcome: Progressing  Goal: Promote skin healing  Outcome: Progressing     Problem: Fall/Injury  Goal: Not fall by end of shift  Outcome: Progressing   The patient's goals for the shift include see baby and eat.    The clinical goals for the shift include pain will be a 4 or less after interventions.  VSS and pt. Is meeting all of her pp milestones.Her pain is under good control with tylenol and motrin.She visits baby often.Stable.  .

## 2024-03-02 NOTE — SIGNIFICANT EVENT
House officer to bedside for evaluation of lower leg swelling, R>L. Patient reports swelling, numbness, and tingling of the right foot and ankle. On exam, ankle are swollen, R>L, no calf tenderness bilaterally. R ankle measures 36cm in diameter. L ankle measures 36cm in diameter. Low suspicion for DVT. Will continue to monitor.     Florence Hill MD PGY-1  Obstetrics & Gynecology

## 2024-03-02 NOTE — ANESTHESIA POSTPROCEDURE EVALUATION
Patient: Shirley Arizmendi    Procedure Summary       Date: 24 Room / Location:     Anesthesia Start: 0405 Anesthesia Stop: 1208    Procedure: Labor Analgesia Diagnosis:     Scheduled Providers:  Responsible Provider: Chi Martinez MD    Anesthesia Type: epidural ASA Status: 2          Shirley Arizmendi is a 33 y.o., , who had a Vaginal, Spontaneous  delivery on 3/1/2024  at 34w3d and is now POD1.    She had Neuraxial Anesthesia without immediate complications noted.       Pain well controlled    Vitals:    24 0347   BP: 120/79   Pulse: 107   Resp: 18   Temp: 36.8 °C (98.2 °F)   SpO2: 98%       Neuraxial site assessed. No visible redness or swelling or drainage. Patient able to ambulate and move all extremities without difficulty. Able to void. No complaints of nausea/vomiting. Tolerating PO intake well. No s/sx of PDPH.     Anesthesia will sign off     Claudio Isabel MD

## 2024-03-03 VITALS
BODY MASS INDEX: 32.25 KG/M2 | HEIGHT: 59 IN | SYSTOLIC BLOOD PRESSURE: 109 MMHG | OXYGEN SATURATION: 97 % | RESPIRATION RATE: 18 BRPM | WEIGHT: 160 LBS | DIASTOLIC BLOOD PRESSURE: 73 MMHG | TEMPERATURE: 98.1 F | HEART RATE: 103 BPM

## 2024-03-03 PROCEDURE — 2500000001 HC RX 250 WO HCPCS SELF ADMINISTERED DRUGS (ALT 637 FOR MEDICARE OP)

## 2024-03-03 PROCEDURE — 2500000004 HC RX 250 GENERAL PHARMACY W/ HCPCS (ALT 636 FOR OP/ED)

## 2024-03-03 RX ORDER — ACETAMINOPHEN 500 MG
1000 TABLET ORAL EVERY 6 HOURS PRN
Qty: 120 TABLET | Refills: 0 | Status: SHIPPED | OUTPATIENT
Start: 2024-03-03

## 2024-03-03 RX ORDER — FUROSEMIDE 20 MG/1
20 TABLET ORAL DAILY
Qty: 5 TABLET | Refills: 0 | Status: SHIPPED | OUTPATIENT
Start: 2024-03-03

## 2024-03-03 RX ORDER — IBUPROFEN 600 MG/1
600 TABLET ORAL EVERY 6 HOURS PRN
Qty: 120 TABLET | Refills: 0 | Status: SHIPPED | OUTPATIENT
Start: 2024-03-03 | End: 2024-04-02

## 2024-03-03 RX ORDER — DOCUSATE SODIUM 100 MG/1
100 CAPSULE, LIQUID FILLED ORAL 2 TIMES DAILY PRN
Qty: 60 CAPSULE | Refills: 0 | Status: SHIPPED | OUTPATIENT
Start: 2024-03-03 | End: 2024-04-02

## 2024-03-03 RX ADMIN — ENOXAPARIN SODIUM 40 MG: 100 INJECTION SUBCUTANEOUS at 01:01

## 2024-03-03 RX ADMIN — ACETAMINOPHEN 975 MG: 325 TABLET ORAL at 12:58

## 2024-03-03 RX ADMIN — IBUPROFEN 600 MG: 600 TABLET, FILM COATED ORAL at 12:58

## 2024-03-03 RX ADMIN — ACETAMINOPHEN 975 MG: 325 TABLET ORAL at 01:01

## 2024-03-03 RX ADMIN — IBUPROFEN 600 MG: 600 TABLET, FILM COATED ORAL at 06:59

## 2024-03-03 RX ADMIN — IBUPROFEN 600 MG: 600 TABLET, FILM COATED ORAL at 01:01

## 2024-03-03 RX ADMIN — ACETAMINOPHEN 975 MG: 325 TABLET ORAL at 06:58

## 2024-03-03 ASSESSMENT — PAIN SCALES - GENERAL
PAINLEVEL_OUTOF10: 4
PAINLEVEL_OUTOF10: 0 - NO PAIN
PAINLEVEL_OUTOF10: 3
PAINLEVEL_OUTOF10: 4

## 2024-03-03 ASSESSMENT — PAIN - FUNCTIONAL ASSESSMENT
PAIN_FUNCTIONAL_ASSESSMENT: 0-10
PAIN_FUNCTIONAL_ASSESSMENT: 0-10

## 2024-03-03 ASSESSMENT — PAIN DESCRIPTION - LOCATION
LOCATION: FOOT
LOCATION: FOOT

## 2024-03-03 ASSESSMENT — PAIN DESCRIPTION - ORIENTATION: ORIENTATION: RIGHT;LEFT

## 2024-03-03 NOTE — CARE PLAN
Problem: Postpartum  Goal: Experiences normal postpartum course  Outcome: Met  Goal: Appropriate maternal -  bonding  Outcome: Met  Goal: Establish and maintain infant feeding pattern for adequate nutrition  Outcome: Met  Goal: Incisions, wounds, or drain sites healing without S/S of infection  Outcome: Met  Goal: No s/sx infection  Outcome: Met  Goal: No s/sx of hemorrhage  Outcome: Met  Goal: Minimal s/sx of HDP and BP<160/110  Outcome: Met     Problem: Skin  Goal: Decreased wound size/increased tissue granulation at next dressing change  Outcome: Met  Goal: Participates in plan/prevention/treatment measures  Outcome: Met  Goal: Prevent/manage excess moisture  Outcome: Met  Goal: Prevent/minimize sheer/friction injuries  Outcome: Met  Goal: Promote/optimize nutrition  Outcome: Met  Goal: Promote skin healing  Outcome: Met     Problem: Fall/Injury  Goal: Not fall by end of shift  Outcome: Met   The patient's goals for the shift include pain will be a 4 or less after interventions.    The clinical goals for the shift include get my test done.     VSS and pt. Is meeting all of her pp. Milestones.Her vaginal bleeding is minimal.She visits baby often.she will take po lasix to help her edema.Her pain is under good control with tylenol and motrin.Stable and ready to go home.

## 2024-03-03 NOTE — DISCHARGE SUMMARY
Discharge Summary    Admission Date: 2024  Discharge Date: 24  Discharge Diagnosis:  premature rupture of membranes (PPROM) with onset of labor after 24 hours of rupture in third trimester, antepartum     Patient Active Problem List   Diagnosis     premature rupture of membranes (PPROM) with onset of labor after 24 hours of rupture in third trimester, antepartum    Anxiety    Depression    History of migraine headaches       Hospital Course  Shirley Arizmendi is a 33 y.o.,     Initially presented for: maternal care for baby in NICU    Admission Date: 2024    Delivery Date: 3/1/2024  12:08 PM     Delivery type: Vaginal, Spontaneous      GA at delivery: 34w3d    Outcome: Living     Anesthesia during delivery: Epidural     Intrapartum complications: None     Feeding method: Breastfeeding Status: No    Contraception: Defers contraception to primary OB/PP visit. We discussed pregnancy spacing of at least one year, abstaining from intercourse for 6wks, and the ability to become pregnant in the absence of regular menses. Pt verbalized understanding.     Rhogam: The patient's blood type is O POS. The baby's blood type is O POS . Rhogam is not indicated.     Now postpartum day: 2.    Hospital course n/f:    Endorses BL pedal pitting edema. Worse since wrapping legs last night. +Paraesthesia BL. Accepts lasix.   PP course otherwise uneventful.  Meeting all postpartum milestones- ambulating independently, passing flatus, tolerating PO intake, lochia light, voiding spontaneously, and pain well controlled with PO meds.     Dispo  OK for DC today  6 week postpartum follow-up with prenatal provider.     Pertinent Physical Exam At Time of Discharge  General: well appearing, well nourished, postpartum  Obstetric: fundus firm below umbilicus, lochia light  Skin: Warm, dry; no rashes/lesions/erythema  Breast: No masses, nipple discharge  Neuro: A/Ox3, conversational, no gross motor deficit   GI: no  distension, appropriately tender, soft, +BS  Respiratory: Even and unlabored on RA, LSCTA BL  Cardiovascular: 3+  pitting edema of feet; No BLE edema; No erythema, warmth  Psych: appropriate mood and affect       Your medication list        START taking these medications        Instructions Last Dose Given Next Dose Due   acetaminophen 500 mg tablet  Commonly known as: Tylenol      Take 2 tablets (1,000 mg) by mouth every 6 hours if needed for moderate pain (4 - 6).       docusate sodium 100 mg capsule  Commonly known as: Colace      Take 1 capsule (100 mg) by mouth 2 times a day as needed for constipation.       ibuprofen 600 mg tablet      Take 1 tablet (600 mg) by mouth every 6 hours if needed for moderate pain (4 - 6) (pain).              CONTINUE taking these medications        Instructions Last Dose Given Next Dose Due   Fetzima 120 mg extended release capsule  Generic drug: levomilnacipran           folic acid 1 mg tablet  Commonly known as: Folvite           gabapentin 400 mg capsule  Commonly known as: Neurontin           nitrofurantoin (macrocrystal-monohydrate) 100 mg capsule  Commonly known as: Macrobid           omeprazole 40 mg DR capsule  Commonly known as: PriLOSEC                     Where to Get Your Medications        These medications were sent to [facility]/pharmacy #[id number] - [place], [place] - [address] [facility]  [address] [address]      Phone: [phone]   acetaminophen 500 mg tablet  docusate sodium 100 mg capsule  ibuprofen 600 mg tablet           Outpatient Follow-Up  Future Appointments   Date Time Provider Department Center   4/2/2024  9:00 AM TRI 3 L&D PROCEDURE ROOM 1 [address] RENA Michel-RICHARD

## 2024-03-03 NOTE — SIGNIFICANT EVENT
Patient ok to take home meds. Medications in med rec reviewed.    Florence Hill MD PGY-1  Obstetrics & Gynecology

## 2024-03-03 NOTE — SIGNIFICANT EVENT
Called to bedside to evaluate bilateral lower leg swelling, R>L. Worsening. Denies chest pain or shortness of breath. Will order bilateral duplex US of lower extremities to r/o DVT.    Discussed with Dr. Justin Hill MD PGY-1  Obstetrics & Gynecology

## 2024-03-04 LAB
BLOOD EXPIRATION DATE: NORMAL
DISPENSE STATUS: NORMAL
GP B STREP GENITAL QL CULT: NORMAL
PRODUCT BLOOD TYPE: 5100
PRODUCT CODE: NORMAL
UNIT ABO: NORMAL
UNIT NUMBER: NORMAL
UNIT RH: NORMAL
UNIT VOLUME: 350
XM INTEP: NORMAL

## 2024-03-06 LAB
LABORATORY COMMENT REPORT: NORMAL
PATH REPORT.FINAL DX SPEC: NORMAL
PATH REPORT.GROSS SPEC: NORMAL
PATH REPORT.RELEVANT HX SPEC: NORMAL
PATH REPORT.TOTAL CANCER: NORMAL

## 2024-04-01 ENCOUNTER — PREP FOR PROCEDURE (OUTPATIENT)
Dept: OBSTETRICS AND GYNECOLOGY | Facility: HOSPITAL | Age: 34
End: 2024-04-01
Payer: COMMERCIAL

## 2024-04-01 RX ORDER — METHYLERGONOVINE MALEATE 0.2 MG/ML
0.2 INJECTION INTRAVENOUS ONCE AS NEEDED
OUTPATIENT
Start: 2024-04-01

## 2024-04-01 RX ORDER — CARBOPROST TROMETHAMINE 250 UG/ML
250 INJECTION, SOLUTION INTRAMUSCULAR ONCE AS NEEDED
OUTPATIENT
Start: 2024-04-01

## 2024-04-01 RX ORDER — METOCLOPRAMIDE 10 MG/1
10 TABLET ORAL EVERY 6 HOURS PRN
OUTPATIENT
Start: 2024-04-01

## 2024-04-01 RX ORDER — NALBUPHINE HYDROCHLORIDE 10 MG/ML
10 INJECTION, SOLUTION INTRAMUSCULAR; INTRAVENOUS; SUBCUTANEOUS
OUTPATIENT
Start: 2024-04-01

## 2024-04-01 RX ORDER — NIFEDIPINE 10 MG/1
10 CAPSULE ORAL ONCE AS NEEDED
OUTPATIENT
Start: 2024-04-01

## 2024-04-01 RX ORDER — ONDANSETRON 4 MG/1
4 TABLET, FILM COATED ORAL EVERY 6 HOURS PRN
OUTPATIENT
Start: 2024-04-01

## 2024-04-01 RX ORDER — TERBUTALINE SULFATE 1 MG/ML
0.25 INJECTION SUBCUTANEOUS ONCE AS NEEDED
OUTPATIENT
Start: 2024-04-01

## 2024-04-01 RX ORDER — METOCLOPRAMIDE HYDROCHLORIDE 5 MG/ML
10 INJECTION INTRAMUSCULAR; INTRAVENOUS EVERY 6 HOURS PRN
OUTPATIENT
Start: 2024-04-01

## 2024-04-01 RX ORDER — OXYTOCIN 10 [USP'U]/ML
10 INJECTION, SOLUTION INTRAMUSCULAR; INTRAVENOUS ONCE AS NEEDED
OUTPATIENT
Start: 2024-04-01

## 2024-04-01 RX ORDER — MISOPROSTOL 200 UG/1
800 TABLET ORAL ONCE AS NEEDED
OUTPATIENT
Start: 2024-04-01

## 2024-04-01 RX ORDER — LIDOCAINE HYDROCHLORIDE 10 MG/ML
30 INJECTION INFILTRATION; PERINEURAL ONCE AS NEEDED
OUTPATIENT
Start: 2024-04-01

## 2024-04-01 RX ORDER — LOPERAMIDE HYDROCHLORIDE 2 MG/1
4 CAPSULE ORAL EVERY 2 HOUR PRN
OUTPATIENT
Start: 2024-04-01

## 2024-04-01 RX ORDER — OXYTOCIN/0.9 % SODIUM CHLORIDE 30/500 ML
60 PLASTIC BAG, INJECTION (ML) INTRAVENOUS
OUTPATIENT
Start: 2024-04-01

## 2024-04-01 RX ORDER — LABETALOL HYDROCHLORIDE 5 MG/ML
20 INJECTION, SOLUTION INTRAVENOUS ONCE AS NEEDED
OUTPATIENT
Start: 2024-04-01

## 2024-04-01 RX ORDER — OXYTOCIN/0.9 % SODIUM CHLORIDE 30/500 ML
2-30 PLASTIC BAG, INJECTION (ML) INTRAVENOUS CONTINUOUS
OUTPATIENT
Start: 2024-04-01

## 2024-04-01 RX ORDER — ONDANSETRON HYDROCHLORIDE 2 MG/ML
4 INJECTION, SOLUTION INTRAVENOUS EVERY 6 HOURS PRN
OUTPATIENT
Start: 2024-04-01

## 2024-04-01 RX ORDER — HYDRALAZINE HYDROCHLORIDE 20 MG/ML
5 INJECTION INTRAMUSCULAR; INTRAVENOUS ONCE AS NEEDED
OUTPATIENT
Start: 2024-04-01

## 2024-04-01 RX ORDER — SODIUM CHLORIDE, SODIUM LACTATE, POTASSIUM CHLORIDE, CALCIUM CHLORIDE 600; 310; 30; 20 MG/100ML; MG/100ML; MG/100ML; MG/100ML
125 INJECTION, SOLUTION INTRAVENOUS CONTINUOUS
OUTPATIENT
Start: 2024-04-01

## 2024-04-01 RX ORDER — TRANEXAMIC ACID 100 MG/ML
1000 INJECTION, SOLUTION INTRAVENOUS ONCE AS NEEDED
OUTPATIENT
Start: 2024-04-01

## 2024-07-19 ENCOUNTER — LAB REQUISITION (OUTPATIENT)
Dept: LAB | Facility: HOSPITAL | Age: 34
End: 2024-07-19
Payer: COMMERCIAL

## 2024-07-19 DIAGNOSIS — L08.9 LOCAL INFECTION OF THE SKIN AND SUBCUTANEOUS TISSUE, UNSPECIFIED: ICD-10-CM

## 2024-07-19 PROCEDURE — 87591 N.GONORRHOEAE DNA AMP PROB: CPT

## 2024-07-19 PROCEDURE — 87491 CHLMYD TRACH DNA AMP PROBE: CPT

## 2024-07-21 LAB
C TRACH RRNA SPEC QL NAA+PROBE: NEGATIVE
N GONORRHOEA DNA SPEC QL PROBE+SIG AMP: NEGATIVE

## 2024-07-26 ENCOUNTER — LAB (OUTPATIENT)
Dept: LAB | Facility: LAB | Age: 34
End: 2024-07-26
Payer: COMMERCIAL

## 2024-07-26 DIAGNOSIS — Z34.81 ENCOUNTER FOR SUPERVISION OF OTHER NORMAL PREGNANCY, FIRST TRIMESTER (HHS-HCC): ICD-10-CM

## 2024-07-26 DIAGNOSIS — Z11.3 ENCOUNTER FOR SCREENING FOR INFECTIONS WITH A PREDOMINANTLY SEXUAL MODE OF TRANSMISSION: ICD-10-CM

## 2024-07-26 DIAGNOSIS — Z13.79 ENCOUNTER FOR OTHER SCREENING FOR GENETIC AND CHROMOSOMAL ANOMALIES: Primary | ICD-10-CM

## 2024-07-26 LAB
ABO GROUP (TYPE) IN BLOOD: NORMAL
ANTIBODY SCREEN: NORMAL
ERYTHROCYTE [DISTWIDTH] IN BLOOD BY AUTOMATED COUNT: 15.2 % (ref 11.5–14.5)
HCT VFR BLD AUTO: 38.7 % (ref 36–46)
HGB BLD-MCNC: 12.7 G/DL (ref 12–16)
MCH RBC QN AUTO: 27.5 PG (ref 26–34)
MCHC RBC AUTO-ENTMCNC: 32.8 G/DL (ref 32–36)
MCV RBC AUTO: 84 FL (ref 80–100)
NRBC BLD-RTO: 0 /100 WBCS (ref 0–0)
PLATELET # BLD AUTO: 271 X10*3/UL (ref 150–450)
RBC # BLD AUTO: 4.61 X10*6/UL (ref 4–5.2)
RH FACTOR (ANTIGEN D): NORMAL
WBC # BLD AUTO: 8.2 X10*3/UL (ref 4.4–11.3)

## 2024-07-26 PROCEDURE — 85027 COMPLETE CBC AUTOMATED: CPT

## 2024-07-26 PROCEDURE — 86850 RBC ANTIBODY SCREEN: CPT

## 2024-07-26 PROCEDURE — 87340 HEPATITIS B SURFACE AG IA: CPT

## 2024-07-26 PROCEDURE — 36415 COLL VENOUS BLD VENIPUNCTURE: CPT

## 2024-07-26 PROCEDURE — 83036 HEMOGLOBIN GLYCOSYLATED A1C: CPT

## 2024-07-26 PROCEDURE — 86803 HEPATITIS C AB TEST: CPT

## 2024-07-26 PROCEDURE — 86317 IMMUNOASSAY INFECTIOUS AGENT: CPT

## 2024-07-26 PROCEDURE — 86901 BLOOD TYPING SEROLOGIC RH(D): CPT

## 2024-07-26 PROCEDURE — 87389 HIV-1 AG W/HIV-1&-2 AB AG IA: CPT

## 2024-07-26 PROCEDURE — 86900 BLOOD TYPING SEROLOGIC ABO: CPT

## 2024-07-26 PROCEDURE — 86780 TREPONEMA PALLIDUM: CPT

## 2024-07-26 PROCEDURE — 87086 URINE CULTURE/COLONY COUNT: CPT

## 2024-07-27 LAB
EST. AVERAGE GLUCOSE BLD GHB EST-MCNC: 100 MG/DL
HBA1C MFR BLD: 5.1 %
HBV SURFACE AG SERPL QL IA: NONREACTIVE
HCV AB SER QL: NONREACTIVE
HIV 1+2 AB+HIV1 P24 AG SERPL QL IA: NONREACTIVE
RUBV IGG SERPL IA-ACNC: 1.2 IA
RUBV IGG SERPL QL IA: POSITIVE
TREPONEMA PALLIDUM IGG+IGM AB [PRESENCE] IN SERUM OR PLASMA BY IMMUNOASSAY: NONREACTIVE

## 2024-07-28 LAB — BACTERIA UR CULT: NORMAL

## 2024-07-31 LAB — SCAN RESULT: NORMAL

## 2024-11-27 ENCOUNTER — HOSPITAL ENCOUNTER (OUTPATIENT)
Dept: RADIOLOGY | Facility: HOSPITAL | Age: 34
Discharge: HOME | End: 2024-11-27
Payer: COMMERCIAL

## 2024-11-27 ENCOUNTER — LAB (OUTPATIENT)
Dept: LAB | Facility: LAB | Age: 34
End: 2024-11-27
Payer: COMMERCIAL

## 2024-11-27 DIAGNOSIS — S93.402A SPRAIN OF UNSPECIFIED LIGAMENT OF LEFT ANKLE, INITIAL ENCOUNTER: ICD-10-CM

## 2024-11-27 DIAGNOSIS — Z34.82 ENCOUNTER FOR SUPERVISION OF OTHER NORMAL PREGNANCY, SECOND TRIMESTER: Primary | ICD-10-CM

## 2024-11-27 LAB
ERYTHROCYTE [DISTWIDTH] IN BLOOD BY AUTOMATED COUNT: 15.3 % (ref 11.5–14.5)
GLUCOSE 1H P 50 G GLC PO SERPL-MCNC: 108 MG/DL
HCT VFR BLD AUTO: 32.9 % (ref 36–46)
HGB BLD-MCNC: 9.6 G/DL (ref 12–16)
MCH RBC QN AUTO: 24.4 PG (ref 26–34)
MCHC RBC AUTO-ENTMCNC: 29.2 G/DL (ref 32–36)
MCV RBC AUTO: 84 FL (ref 80–100)
NRBC BLD-RTO: 0 /100 WBCS (ref 0–0)
PLATELET # BLD AUTO: 237 X10*3/UL (ref 150–450)
RBC # BLD AUTO: 3.94 X10*6/UL (ref 4–5.2)
WBC # BLD AUTO: 9.4 X10*3/UL (ref 4.4–11.3)

## 2024-11-27 PROCEDURE — 73700 CT LOWER EXTREMITY W/O DYE: CPT | Mod: LT

## 2024-11-27 PROCEDURE — 83550 IRON BINDING TEST: CPT

## 2024-11-27 PROCEDURE — 82607 VITAMIN B-12: CPT

## 2024-11-27 PROCEDURE — 82728 ASSAY OF FERRITIN: CPT

## 2024-11-27 PROCEDURE — 82746 ASSAY OF FOLIC ACID SERUM: CPT

## 2024-11-27 PROCEDURE — 73700 CT LOWER EXTREMITY W/O DYE: CPT | Mod: LEFT SIDE | Performed by: RADIOLOGY

## 2024-11-27 PROCEDURE — 85027 COMPLETE CBC AUTOMATED: CPT

## 2024-11-27 PROCEDURE — 36415 COLL VENOUS BLD VENIPUNCTURE: CPT

## 2024-11-27 PROCEDURE — 82947 ASSAY GLUCOSE BLOOD QUANT: CPT

## 2024-11-28 LAB
FERRITIN SERPL-MCNC: 15 NG/ML
FOLATE SERPL-MCNC: 15.5 NG/ML
IRON SATN MFR SERPL: NORMAL %
IRON SERPL-MCNC: 36 UG/DL
REFLEX ADDED, ANEMIA PANEL: NORMAL
TIBC SERPL-MCNC: NORMAL UG/DL
UIBC SERPL-MCNC: >450 UG/DL
VIT B12 SERPL-MCNC: 155 PG/ML

## 2025-01-06 ENCOUNTER — APPOINTMENT (OUTPATIENT)
Dept: PHYSICAL THERAPY | Facility: CLINIC | Age: 35
End: 2025-01-06
Payer: COMMERCIAL

## 2025-01-10 ENCOUNTER — HOSPITAL ENCOUNTER (OUTPATIENT)
Dept: RADIOLOGY | Facility: CLINIC | Age: 35
Discharge: HOME | End: 2025-01-10
Payer: COMMERCIAL

## 2025-01-10 ENCOUNTER — OFFICE VISIT (OUTPATIENT)
Dept: ORTHOPEDIC SURGERY | Facility: CLINIC | Age: 35
End: 2025-01-10
Payer: COMMERCIAL

## 2025-01-10 DIAGNOSIS — M25.572 ACUTE LEFT ANKLE PAIN: ICD-10-CM

## 2025-01-10 DIAGNOSIS — S93.432A ANKLE SYNDESMOSIS DISRUPTION, LEFT, INITIAL ENCOUNTER: ICD-10-CM

## 2025-01-10 DIAGNOSIS — S82.392A CLOSED FRACTURE OF POSTERIOR MALLEOLUS OF LEFT TIBIA, INITIAL ENCOUNTER: Primary | ICD-10-CM

## 2025-01-10 PROCEDURE — 99214 OFFICE O/P EST MOD 30 MIN: CPT | Performed by: STUDENT IN AN ORGANIZED HEALTH CARE EDUCATION/TRAINING PROGRAM

## 2025-01-10 PROCEDURE — 73610 X-RAY EXAM OF ANKLE: CPT | Mod: LT

## 2025-01-10 PROCEDURE — 99204 OFFICE O/P NEW MOD 45 MIN: CPT | Performed by: STUDENT IN AN ORGANIZED HEALTH CARE EDUCATION/TRAINING PROGRAM

## 2025-01-10 ASSESSMENT — PAIN SCALES - GENERAL: PAINLEVEL_OUTOF10: 3

## 2025-01-10 ASSESSMENT — PAIN DESCRIPTION - DESCRIPTORS: DESCRIPTORS: ACHING;DULL;DISCOMFORT

## 2025-01-10 ASSESSMENT — PAIN - FUNCTIONAL ASSESSMENT: PAIN_FUNCTIONAL_ASSESSMENT: 0-10

## 2025-01-10 NOTE — LETTER
January 10, 2025     Patient: Shirley Arizmendi   YOB: 1990   Date of Visit: 1/10/2025       To Whom It May Concern:    It is my medical opinion that Shirley Arizmendi  is being treated for a left ankle injury. She should be permitted to take a 15-minute seated break for every hour worked to rest and elevate her ankle as needed. We will see her back in clinic in 6-8 weeks for re-evaluation  .    If you have any questions or concerns, please don't hesitate to call.         Sincerely,        Regis Polanco MD    CC: No Recipients

## 2025-01-11 NOTE — PROGRESS NOTES
ORTHOPAEDIC SURGERY OUTPATIENT PROGRESS NOTE    Chief Complaint:  Left ankle pain    History Of Present Illness  Shirley Arizmendi is a 34 y.o. female who presents for evaluation of left ankle pain, self-referred.  Patient initially sustained an injury to her ankle from 10/30/2024 in a relatively plantarflexed position.  She was seen in an urgent care setting where imaging was obtained and she was recommended for outpatient follow-up.  Patient unfortunately persisted with pain.  She has been taking anti-inflammatories including ibuprofen.  She is currently pregnant and has 6 children at home.  She has continued to work with severe pain.  Typical pain is reported as 3 out of 10 at present but can be severe at times.  Patient reports she has a high pain threshold and is having difficulty putting full weight on her ankle due to pain.  She is scheduled for induction and several weeks as well as to begin physical therapy.     Past Medical History  Past Medical History:   Diagnosis Date    Other conditions influencing health status     No significant past surgical history       Surgical History  Recent Surgeries in Orthopaedic Surgery            No cases to display             Social History  Social History     Socioeconomic History    Marital status: Single   Tobacco Use    Smoking status: Unknown     Passive exposure: Never   Vaping Use    Vaping status: Unknown   Substance and Sexual Activity    Alcohol use: Not Currently    Drug use: Never     Social Drivers of Health     Financial Resource Strain: Low Risk  (3/1/2024)    Overall Financial Resource Strain (CARDIA)     Difficulty of Paying Living Expenses: Not hard at all   Food Insecurity: Not on File (9/26/2024)    Received from [facility]    Food Insecurity     Food: 0   Transportation Needs: No Transportation Needs (3/1/2024)    PRAPARE - Transportation     Lack of Transportation (Medical): No     Lack of Transportation (Non-Medical): No   Physical Activity: Not on File  (5/22/2021)    Received from [facility]    Physical Activity     Physical Activity: 0   Stress: Not on File (5/22/2021)    Received from [facility]    Stress     Stress: 0   Social Connections: Not on File (9/8/2024)    Received from [facility]    Social Connections     Connectedness: 0       Family History  No family history on file.     Allergies  Allergies   Allergen Reactions    Latex, Natural Rubber Hives    Sulfa (Sulfonamide Antibiotics) Hives       Review of Systems  REVIEW OF SYSTEMS  Constitutional: no unplanned weight loss  Psychiatric: no suicidal ideation  ENT: no vision changes, no sinus problems  Pulmonary: no shortness of breath  Lymphatic: no enlarged lymph nodes  Cardiovascular: no chest pain or shortness of breath  Gastrointestinal: no stomach problems  Genitourinary: no dysuria   Skin: no rashes  Endocrine: no thyroid problems  Neurological: no headache, no numbness  Hematological: no easy bruising  Musculoskeletal: Left ankle pain     Physical Exam  PHYSICAL EXAMINATION  Constitutional Exam: well developed and well nourished  Psychiatric Exam: alert and oriented, appropriate mood and behavior  Eye Exam: EOMI  Pulmonary Exam: breathing non-labored, no apparent distress  Lymphatic exam: no appreciable lymphadenopathy in the lower extremities  Cardiovascular exam: RRR to peripheral palpation, DP pulses 2+, PT 2+, toes are pink with good capillary refill, no pitting edema  Skin exam: no open lesions, rashes, abrasions or ulcerations  Neurological exam: sensation to light touch intact in both lower extremities in peripheral and dermatomal distributions (except for any abnormalities noted in musculoskeletal exam)    Musculoskeletal exam: Left lower extremity examination.  Patient pain localized to fusilli about the ankle.  She is not tender to palpation overlying the distal fibula with focal tenderness to palpation at the AITFL, nontender to palpation of the ATFL, CFL and peroneal tendons.  Also  nontender to palpation overlying the medial deltoid. Patient has sensation intact to light touch grossly in a saphenous, sural, superficial peroneal, deep peroneal and tibial nerve distribution.  Patient has 5 out of 5 strength in plantarflexion, dorsiflexion and EHL. Patient has 2+ DP/PT pulse palpated.  Nontender to palpation proximal fibula.     Last Recorded Vitals  Last menstrual period 07/04/2023, not currently breastfeeding.    Laboratory Results  No results found for this or any previous visit (from the past 24 hours).     Radiology Results  X-ray imaging 3 view weightbearing left ankle reviewed and independently evaluated by me on 01/10/2025 demonstrates no acute fracture or dislocation, noted periosteal reaction distal posterior tibia, ankle mortise appears well aligned.    CT of the left ankle reviewed from 11/27/2024 and independently evaluated by me on 01/10/2025 demonstrates callus healing about comminuted posterior malleolar fracture, suggestion of anterior syndesmotic avulsion fracture.    Assessment/Plan:  34-year-old female who in my impression has left ankle pain secondary to apparent hyper plantarflexion variant distal fibula fracture with findings suggestive of syndesmotic injury from 10/30/2024 with persistent pain and continued weightbearing.  I have reviewed the diagnosis and treatment options extensively with the patient.  I reviewed that this is obviously complex situation, particularly given her pregnancy and scheduled induction.  As the patient has been weightbearing since the time of injury, I would recommend that she continue weightbearing to her tolerance, she may certainly consider a lace up style ankle brace for increased support.  She may trial physical therapy and I reviewed that certainly consideration could be made for left ankle CSI for symptomatic pain relief.  I reviewed that if she persists with pain that certainly consideration could be made for left ankle arthroscopy with  syndesmotic stabilization as indicated.  I would plan to see the patient back in approximately 6 weeks for repeat clinical and radiographic evaluation.  Upon return, patient would require three-view weightbearing left ankle.    Regis Polanco MD, [place]  Department of Orthopaedic Surgery  [facility]    The diagnosis and treatment plan were reviewed with the patient. All questions were answered. The patient verbalized understanding of the treatment plan. There were no barriers to understanding identified.    Note dictated with [employer] software.  Completed without full type editing and sent to avoid delay.

## 2025-01-15 ENCOUNTER — EVALUATION (OUTPATIENT)
Dept: PHYSICAL THERAPY | Facility: CLINIC | Age: 35
End: 2025-01-15
Payer: COMMERCIAL

## 2025-01-15 DIAGNOSIS — M25.572 LEFT ANKLE PAIN: Primary | ICD-10-CM

## 2025-01-15 PROCEDURE — 97110 THERAPEUTIC EXERCISES: CPT | Mod: GP

## 2025-01-15 PROCEDURE — 97161 PT EVAL LOW COMPLEX 20 MIN: CPT | Mod: GP

## 2025-01-15 NOTE — PROGRESS NOTES
Physical Therapy Evaluation     Patient Name: Shirley Arizmendi  MRN: [id number]  Today's Date: 1/15/2025  Time Calculation  Start Time: 1333  Stop Time: 1416  Time Calculation (min): 43 min      Insurance:  Number of Treatments Authorized: 1/40v (DED 3000 100% COVERAGE 40 V A YR REF# -JULIETH MATTA. SECONDARY MEDICAID 30 V A % COVERAGE)          Current Problem:   1. Left ankle pain  Follow Up In Physical Therapy          Precautions:  Precautions  Precautions Comment: No Fall risk    Reason for visit: Closed fracture of posterior malleolus of left tibia   Referred by: Regis Polanco     Work, mechanical stresses: Pharmacy tech   Leisure, mechanical stresses:  Mom to 6 kids, currently pregnant   Prior to onset the pt was able to complete all work/leisure/functional activities without limitation.  Functional disability from present episode/Primary goal for PT: None stated/ decrease pain and improve tolerance to weight bearing activities   Present symptoms: Deep ankle pain   Present since: Halloween 2024 and getting unchanging  Commenced as a result of fall down stairs over a bag of halloween candy while carrying youngest   Symptoms at onset: Deep L ankle   Constant symptoms: L ankle   Intermittent symptoms: none  Pain ranges from: 1-6/10  Pt describes pain as: Achy   Symptoms are worse with: Walking, standing, weightbearing activities   Symptoms are better with:  Rest/non weight bearing   Continued use makes the pain: Worse   Other questions: Swelling - occasionally   Previous episodes: none   Previous treatments: Air cast after imaging directly after fall  Imaging: Xrays of L ankle on 1/10/25: Interval healing changes of nondisplaced posterior tibial malleolus fracture compared to 11/27/2024. Difficult to tell whether this is will demonstrate continued healing on follow-up radiographs or whether this reflects the greatest degree of radiographic healing.  Red Flags: recent/major surgery - no, night pain - no,  accidents - no, unexplained weight loss - no    Objective Findings:  Outcome Measures:  Other Measures  Lower Extremity Funtional Score (LEFS): 48/80  Posture: Fair  Correction: NE  Ankle ROM - Nil/Min/Mod/Max limit or degrees. - in seated   Dorsiflexion: R = 9, L = 0 - ERP   Plantarflexion: R = 65, L = 65- ERP   Inversion: R = 35, L = 30  Eversion: R = 10, L = 8 - ERP     MMT: Ankle  Dorsiflexion: R = 5/5, L = 5/5 - ERP   Plantarflexion: R = 5/5, L = 5/5  Inversion: R = 5/5, L = 4+/5 - ERP   Eversion: R = 5/5, L = 4+/5    Treatment:   Ther ex  Seated heel slides x10   Seated unsupported DF x10   Seated unsupported eversion x10  Seated unsupported PF x10   Seated unsupported inversion x10     Gait training    100 ft w/ SPC with verbal cues for sequencing and promote offload L LE    100 ft w/ crutch w/ verbal cues for sequencing and promote offload L LE    Educated pt on proper response to exercise, use of assisted device to offload L LE for community ambulation for pain management.  Issued handout for HEP  HEP/med bridge:   Exercises  - Seated Toe Raise  - 4-5 x daily - 7 x weekly - 1 sets - 10 reps  - Seated Heel Raise  - 4-5 x daily - 7 x weekly - 1 sets - 10 reps  - Seated Ankle Inversion Eversion AROM  - 4-5 x daily - 7 x weekly - 1 sets - 10 reps  - Seated Ankle Inversion AROM  - 4-5 x daily - 7 x weekly - 1 sets - 10 reps    Assessment: Pt presents to PT with complaint of L ankle pain that began on 10/3/24 after she slipped and fell on her child's halloween bag while going down the stairs which resulted in a nondisplaced posterior tibial malleolus fracture.  Pt will benefit from skilled PT to address ROM/strength/functional limitations and pain noted during evaluation today.    Plan of care:  Problem List: Pain, Functional limitations, activity limitations, ROM limitations, Posture, Gait, Transfer, Activity Limitations, IADLS/ADLS/Self care  Goals:  STG:  Pain = 0-3/10 L ankle by week 3  Pt will report HEP  compliance by week 1  Pt will be able to amb without gait deviations for household distances by week 3  LTG:  L ankle ROM = R ankle/nil limit in all directions by week 6  5/5 B LEs with MMTing without pain  by week 6  Pt will achieve a clinically significant improvement in LEFS  by week 6  Pt will report >/= 80% improvement/progress towards PT goals by week 6  Pt will be able to amb community distance without lasting increase in L ankle pain by week 6  Planned interventions: Ther ex, Neuromuscular re-ed, ther act, Manual, Education, Home program, Estim, Hot therapy, Cold therapy, Vaso  Planned visits: x 1 a week for 6 weeks for 5-6 visits   The POC was created, discussed, and agreed on with the patient.

## 2025-01-18 ENCOUNTER — LAB REQUISITION (OUTPATIENT)
Dept: LAB | Facility: HOSPITAL | Age: 35
End: 2025-01-18
Payer: COMMERCIAL

## 2025-01-18 DIAGNOSIS — Z34.83 ENCOUNTER FOR SUPERVISION OF OTHER NORMAL PREGNANCY, THIRD TRIMESTER: ICD-10-CM

## 2025-01-18 PROCEDURE — 87081 CULTURE SCREEN ONLY: CPT

## 2025-01-21 LAB — GP B STREP GENITAL QL CULT: NORMAL

## 2025-01-22 ENCOUNTER — APPOINTMENT (OUTPATIENT)
Dept: PHYSICAL THERAPY | Facility: CLINIC | Age: 35
End: 2025-01-22
Payer: COMMERCIAL

## 2025-01-29 ENCOUNTER — APPOINTMENT (OUTPATIENT)
Dept: PHYSICAL THERAPY | Facility: CLINIC | Age: 35
End: 2025-01-29
Payer: COMMERCIAL

## 2025-02-02 ENCOUNTER — HOSPITAL ENCOUNTER (INPATIENT)
Facility: HOSPITAL | Age: 35
LOS: 2 days | Discharge: HOME | End: 2025-02-04
Attending: STUDENT IN AN ORGANIZED HEALTH CARE EDUCATION/TRAINING PROGRAM | Admitting: OBSTETRICS & GYNECOLOGY
Payer: COMMERCIAL

## 2025-02-02 PROBLEM — Z37.9 NORMAL LABOR (HHS-HCC): Status: ACTIVE | Noted: 2025-02-02

## 2025-02-02 LAB
ABO GROUP (TYPE) IN BLOOD: NORMAL
ALBUMIN SERPL BCP-MCNC: 3.3 G/DL (ref 3.4–5)
ALP SERPL-CCNC: 152 U/L (ref 33–110)
ALT SERPL W P-5'-P-CCNC: 10 U/L (ref 7–45)
ANION GAP SERPL CALCULATED.3IONS-SCNC: 12 MMOL/L (ref 10–20)
ANTIBODY SCREEN: NORMAL
AST SERPL W P-5'-P-CCNC: 14 U/L (ref 9–39)
BILIRUB SERPL-MCNC: 0.3 MG/DL (ref 0–1.2)
BUN SERPL-MCNC: 7 MG/DL (ref 6–23)
CALCIUM SERPL-MCNC: 8.5 MG/DL (ref 8.6–10.3)
CHLORIDE SERPL-SCNC: 106 MMOL/L (ref 98–107)
CO2 SERPL-SCNC: 21 MMOL/L (ref 21–32)
CREAT SERPL-MCNC: 0.65 MG/DL (ref 0.5–1.05)
CREAT UR-MCNC: 103.8 MG/DL (ref 20–320)
EGFRCR SERPLBLD CKD-EPI 2021: >90 ML/MIN/1.73M*2
ERYTHROCYTE [DISTWIDTH] IN BLOOD BY AUTOMATED COUNT: 18.6 % (ref 11.5–14.5)
GLUCOSE SERPL-MCNC: 91 MG/DL (ref 74–99)
HCT VFR BLD AUTO: 36.5 % (ref 36–46)
HGB BLD-MCNC: 11.1 G/DL (ref 12–16)
MCH RBC QN AUTO: 23.9 PG (ref 26–34)
MCHC RBC AUTO-ENTMCNC: 30.4 G/DL (ref 32–36)
MCV RBC AUTO: 79 FL (ref 80–100)
NRBC BLD-RTO: 0 /100 WBCS (ref 0–0)
PLATELET # BLD AUTO: 196 X10*3/UL (ref 150–450)
POTASSIUM SERPL-SCNC: 3.5 MMOL/L (ref 3.5–5.3)
PROT SERPL-MCNC: 5.9 G/DL (ref 6.4–8.2)
PROT UR-MCNC: 11 MG/DL (ref 5–24)
PROT/CREAT UR: 0.11 MG/MG CREAT (ref 0–0.17)
RBC # BLD AUTO: 4.65 X10*6/UL (ref 4–5.2)
RH FACTOR (ANTIGEN D): NORMAL
SODIUM SERPL-SCNC: 135 MMOL/L (ref 136–145)
WBC # BLD AUTO: 8.5 X10*3/UL (ref 4.4–11.3)

## 2025-02-02 PROCEDURE — 1220000001 HC OB SEMI-PRIVATE ROOM DAILY

## 2025-02-02 PROCEDURE — 2500000001 HC RX 250 WO HCPCS SELF ADMINISTERED DRUGS (ALT 637 FOR MEDICARE OP): Performed by: STUDENT IN AN ORGANIZED HEALTH CARE EDUCATION/TRAINING PROGRAM

## 2025-02-02 PROCEDURE — 2500000004 HC RX 250 GENERAL PHARMACY W/ HCPCS (ALT 636 FOR OP/ED): Performed by: STUDENT IN AN ORGANIZED HEALTH CARE EDUCATION/TRAINING PROGRAM

## 2025-02-02 PROCEDURE — 86850 RBC ANTIBODY SCREEN: CPT | Performed by: STUDENT IN AN ORGANIZED HEALTH CARE EDUCATION/TRAINING PROGRAM

## 2025-02-02 PROCEDURE — 80053 COMPREHEN METABOLIC PANEL: CPT | Performed by: STUDENT IN AN ORGANIZED HEALTH CARE EDUCATION/TRAINING PROGRAM

## 2025-02-02 PROCEDURE — 85027 COMPLETE CBC AUTOMATED: CPT | Performed by: STUDENT IN AN ORGANIZED HEALTH CARE EDUCATION/TRAINING PROGRAM

## 2025-02-02 PROCEDURE — 3700000014 EPIDURAL BLOCK: Performed by: NURSE ANESTHETIST, CERTIFIED REGISTERED

## 2025-02-02 PROCEDURE — 2500000004 HC RX 250 GENERAL PHARMACY W/ HCPCS (ALT 636 FOR OP/ED): Performed by: NURSE ANESTHETIST, CERTIFIED REGISTERED

## 2025-02-02 PROCEDURE — 82570 ASSAY OF URINE CREATININE: CPT | Performed by: STUDENT IN AN ORGANIZED HEALTH CARE EDUCATION/TRAINING PROGRAM

## 2025-02-02 PROCEDURE — 86900 BLOOD TYPING SEROLOGIC ABO: CPT | Performed by: STUDENT IN AN ORGANIZED HEALTH CARE EDUCATION/TRAINING PROGRAM

## 2025-02-02 PROCEDURE — 36415 COLL VENOUS BLD VENIPUNCTURE: CPT | Performed by: STUDENT IN AN ORGANIZED HEALTH CARE EDUCATION/TRAINING PROGRAM

## 2025-02-02 PROCEDURE — 86780 TREPONEMA PALLIDUM: CPT | Mod: TRILAB | Performed by: STUDENT IN AN ORGANIZED HEALTH CARE EDUCATION/TRAINING PROGRAM

## 2025-02-02 RX ORDER — CARBOPROST TROMETHAMINE 250 UG/ML
250 INJECTION, SOLUTION INTRAMUSCULAR ONCE AS NEEDED
Status: DISCONTINUED | OUTPATIENT
Start: 2025-02-02 | End: 2025-02-03

## 2025-02-02 RX ORDER — NIFEDIPINE 10 MG/1
10 CAPSULE ORAL ONCE AS NEEDED
Status: DISCONTINUED | OUTPATIENT
Start: 2025-02-02 | End: 2025-02-03

## 2025-02-02 RX ORDER — LABETALOL HYDROCHLORIDE 5 MG/ML
20 INJECTION, SOLUTION INTRAVENOUS ONCE AS NEEDED
Status: DISCONTINUED | OUTPATIENT
Start: 2025-02-02 | End: 2025-02-03

## 2025-02-02 RX ORDER — TERBUTALINE SULFATE 1 MG/ML
0.25 INJECTION SUBCUTANEOUS ONCE AS NEEDED
Status: DISCONTINUED | OUTPATIENT
Start: 2025-02-02 | End: 2025-02-03

## 2025-02-02 RX ORDER — OXYTOCIN/0.9 % SODIUM CHLORIDE 30/500 ML
60 PLASTIC BAG, INJECTION (ML) INTRAVENOUS ONCE AS NEEDED
Status: DISCONTINUED | OUTPATIENT
Start: 2025-02-02 | End: 2025-02-03

## 2025-02-02 RX ORDER — CALCIUM CARBONATE 200(500)MG
1000 TABLET,CHEWABLE ORAL 4 TIMES DAILY PRN
Status: DISCONTINUED | OUTPATIENT
Start: 2025-02-02 | End: 2025-02-03

## 2025-02-02 RX ORDER — TRANEXAMIC ACID 10 MG/ML
1000 INJECTION, SOLUTION INTRAVENOUS ONCE AS NEEDED
Status: DISCONTINUED | OUTPATIENT
Start: 2025-02-02 | End: 2025-02-03

## 2025-02-02 RX ORDER — ONDANSETRON 4 MG/1
4 TABLET, FILM COATED ORAL EVERY 6 HOURS PRN
Status: DISCONTINUED | OUTPATIENT
Start: 2025-02-02 | End: 2025-02-03

## 2025-02-02 RX ORDER — MISOPROSTOL 200 UG/1
800 TABLET ORAL ONCE AS NEEDED
Status: DISCONTINUED | OUTPATIENT
Start: 2025-02-02 | End: 2025-02-03

## 2025-02-02 RX ORDER — SODIUM CHLORIDE, SODIUM LACTATE, POTASSIUM CHLORIDE, CALCIUM CHLORIDE 600; 310; 30; 20 MG/100ML; MG/100ML; MG/100ML; MG/100ML
75 INJECTION, SOLUTION INTRAVENOUS CONTINUOUS
Status: DISCONTINUED | OUTPATIENT
Start: 2025-02-02 | End: 2025-02-03

## 2025-02-02 RX ORDER — OXYTOCIN 10 [USP'U]/ML
10 INJECTION, SOLUTION INTRAMUSCULAR; INTRAVENOUS ONCE AS NEEDED
Status: DISCONTINUED | OUTPATIENT
Start: 2025-02-02 | End: 2025-02-03

## 2025-02-02 RX ORDER — ONDANSETRON HYDROCHLORIDE 2 MG/ML
4 INJECTION, SOLUTION INTRAVENOUS EVERY 6 HOURS PRN
Status: DISCONTINUED | OUTPATIENT
Start: 2025-02-02 | End: 2025-02-03

## 2025-02-02 RX ORDER — FENTANYL/ROPIVACAINE/NS/PF 2MCG/ML-.2
PLASTIC BAG, INJECTION (ML) INJECTION
Status: COMPLETED
Start: 2025-02-02 | End: 2025-02-03

## 2025-02-02 RX ORDER — METHYLERGONOVINE MALEATE 0.2 MG/ML
0.2 INJECTION INTRAVENOUS ONCE AS NEEDED
Status: DISCONTINUED | OUTPATIENT
Start: 2025-02-02 | End: 2025-02-03

## 2025-02-02 RX ORDER — HYDRALAZINE HYDROCHLORIDE 20 MG/ML
5 INJECTION INTRAMUSCULAR; INTRAVENOUS ONCE AS NEEDED
Status: DISCONTINUED | OUTPATIENT
Start: 2025-02-02 | End: 2025-02-03

## 2025-02-02 RX ORDER — LOPERAMIDE HYDROCHLORIDE 2 MG/1
4 CAPSULE ORAL EVERY 2 HOUR PRN
Status: DISCONTINUED | OUTPATIENT
Start: 2025-02-02 | End: 2025-02-03

## 2025-02-02 RX ORDER — LIDOCAINE HYDROCHLORIDE 10 MG/ML
30 INJECTION, SOLUTION INFILTRATION; PERINEURAL ONCE AS NEEDED
Status: DISCONTINUED | OUTPATIENT
Start: 2025-02-02 | End: 2025-02-03

## 2025-02-02 RX ORDER — FENTANYL/ROPIVACAINE/NS/PF 2MCG/ML-.2
0-25 PLASTIC BAG, INJECTION (ML) INJECTION CONTINUOUS
Status: DISCONTINUED | OUTPATIENT
Start: 2025-02-02 | End: 2025-02-03

## 2025-02-02 RX ADMIN — Medication 1000 MG: at 23:33

## 2025-02-02 RX ADMIN — Medication 4 ML: at 22:50

## 2025-02-02 RX ADMIN — SODIUM CHLORIDE 500 ML: 9 INJECTION, SOLUTION INTRAVENOUS at 22:45

## 2025-02-02 RX ADMIN — Medication 8 ML/HR: at 22:56

## 2025-02-02 SDOH — SOCIAL STABILITY: SOCIAL INSECURITY: VERBAL ABUSE: DENIES

## 2025-02-02 SDOH — HEALTH STABILITY: PHYSICAL HEALTH
HOW OFTEN DO YOU NEED TO HAVE SOMEONE HELP YOU WHEN YOU READ INSTRUCTIONS, PAMPHLETS, OR OTHER WRITTEN MATERIAL FROM YOUR DOCTOR OR PHARMACY?: NEVER

## 2025-02-02 SDOH — SOCIAL STABILITY: SOCIAL INSECURITY: WITHIN THE LAST YEAR, HAVE YOU BEEN AFRAID OF YOUR PARTNER OR EX-PARTNER?: NO

## 2025-02-02 SDOH — HEALTH STABILITY: MENTAL HEALTH: NON-SPECIFIC ACTIVE SUICIDAL THOUGHTS (PAST 1 MONTH): NO

## 2025-02-02 SDOH — ECONOMIC STABILITY: FOOD INSECURITY: WITHIN THE PAST 12 MONTHS, THE FOOD YOU BOUGHT JUST DIDN'T LAST AND YOU DIDN'T HAVE MONEY TO GET MORE.: NEVER TRUE

## 2025-02-02 SDOH — SOCIAL STABILITY: SOCIAL INSECURITY: PHYSICAL ABUSE: DENIES

## 2025-02-02 SDOH — HEALTH STABILITY: MENTAL HEALTH: STRENGTHS (MUST CHOOSE TWO): SUPPORT FROM FAMILY;SUPPORT FROM FRIENDS

## 2025-02-02 SDOH — SOCIAL STABILITY: SOCIAL INSECURITY: HAS ANYONE EVER THREATENED TO HURT YOUR FAMILY OR YOUR PETS?: NO

## 2025-02-02 SDOH — SOCIAL STABILITY: SOCIAL INSECURITY: ABUSE SCREEN: ADULT

## 2025-02-02 SDOH — HEALTH STABILITY: MENTAL HEALTH: SUICIDAL BEHAVIOR (LIFETIME): NO

## 2025-02-02 SDOH — SOCIAL STABILITY: SOCIAL INSECURITY
WITHIN THE LAST YEAR, HAVE YOU BEEN RAPED OR FORCED TO HAVE ANY KIND OF SEXUAL ACTIVITY BY YOUR PARTNER OR EX-PARTNER?: NO

## 2025-02-02 SDOH — HEALTH STABILITY: MENTAL HEALTH
DO YOU FEEL STRESS - TENSE, RESTLESS, NERVOUS, OR ANXIOUS, OR UNABLE TO SLEEP AT NIGHT BECAUSE YOUR MIND IS TROUBLED ALL THE TIME - THESE DAYS?: NOT AT ALL

## 2025-02-02 SDOH — HEALTH STABILITY: MENTAL HEALTH: HOW MANY DRINKS CONTAINING ALCOHOL DO YOU HAVE ON A TYPICAL DAY WHEN YOU ARE DRINKING?: PATIENT DOES NOT DRINK

## 2025-02-02 SDOH — HEALTH STABILITY: MENTAL HEALTH: WISH TO BE DEAD (PAST 1 MONTH): NO

## 2025-02-02 SDOH — ECONOMIC STABILITY: FOOD INSECURITY: WITHIN THE PAST 12 MONTHS, YOU WORRIED THAT YOUR FOOD WOULD RUN OUT BEFORE YOU GOT THE MONEY TO BUY MORE.: NEVER TRUE

## 2025-02-02 SDOH — ECONOMIC STABILITY: HOUSING INSECURITY: DO YOU FEEL UNSAFE GOING BACK TO THE PLACE WHERE YOU ARE LIVING?: NO

## 2025-02-02 SDOH — HEALTH STABILITY: MENTAL HEALTH: HOW OFTEN DO YOU HAVE A DRINK CONTAINING ALCOHOL?: NEVER

## 2025-02-02 SDOH — HEALTH STABILITY: PHYSICAL HEALTH: ON AVERAGE, HOW MANY DAYS PER WEEK DO YOU ENGAGE IN MODERATE TO STRENUOUS EXERCISE (LIKE A BRISK WALK)?: 7 DAYS

## 2025-02-02 SDOH — HEALTH STABILITY: MENTAL HEALTH: HAVE YOU USED ANY SUBSTANCES (CANABIS, COCAINE, HEROIN, HALLUCINOGENS, INHALANTS, ETC.) IN THE PAST 12 MONTHS?: NO

## 2025-02-02 SDOH — SOCIAL STABILITY: SOCIAL INSECURITY: ARE YOU OR HAVE YOU BEEN THREATENED OR ABUSED PHYSICALLY, EMOTIONALLY, OR SEXUALLY BY ANYONE?: NO

## 2025-02-02 SDOH — SOCIAL STABILITY: SOCIAL NETWORK
DO YOU BELONG TO ANY CLUBS OR ORGANIZATIONS SUCH AS CHURCH GROUPS, UNIONS, FRATERNAL OR ATHLETIC GROUPS, OR SCHOOL GROUPS?: NO

## 2025-02-02 SDOH — SOCIAL STABILITY: SOCIAL INSECURITY: WITHIN THE LAST YEAR, HAVE YOU BEEN HUMILIATED OR EMOTIONALLY ABUSED IN OTHER WAYS BY YOUR PARTNER OR EX-PARTNER?: NO

## 2025-02-02 SDOH — SOCIAL STABILITY: SOCIAL NETWORK: IN A TYPICAL WEEK, HOW MANY TIMES DO YOU TALK ON THE PHONE WITH FAMILY, FRIENDS, OR NEIGHBORS?: THREE TIMES A WEEK

## 2025-02-02 SDOH — SOCIAL STABILITY: SOCIAL INSECURITY: DO YOU FEEL ANYONE HAS EXPLOITED OR TAKEN ADVANTAGE OF YOU FINANCIALLY OR OF YOUR PERSONAL PROPERTY?: NO

## 2025-02-02 SDOH — SOCIAL STABILITY: SOCIAL INSECURITY: ARE YOU MARRIED, WIDOWED, DIVORCED, SEPARATED, NEVER MARRIED, OR LIVING WITH A PARTNER?: PATIENT DECLINED

## 2025-02-02 SDOH — HEALTH STABILITY: MENTAL HEALTH: HAVE YOU USED ANY PRESCRIPTION DRUGS OTHER THAN PRESCRIBED IN THE PAST 12 MONTHS?: NO

## 2025-02-02 SDOH — SOCIAL STABILITY: SOCIAL INSECURITY: HAVE YOU HAD ANY THOUGHTS OF HARMING ANYONE ELSE?: NO

## 2025-02-02 SDOH — SOCIAL STABILITY: SOCIAL INSECURITY
WITHIN THE LAST YEAR, HAVE YOU BEEN KICKED, HIT, SLAPPED, OR OTHERWISE PHYSICALLY HURT BY YOUR PARTNER OR EX-PARTNER?: NO

## 2025-02-02 SDOH — SOCIAL STABILITY: SOCIAL NETWORK: HOW OFTEN DO YOU GET TOGETHER WITH FRIENDS OR RELATIVES?: THREE TIMES A WEEK

## 2025-02-02 SDOH — HEALTH STABILITY: MENTAL HEALTH: WERE YOU ABLE TO COMPLETE ALL THE BEHAVIORAL HEALTH SCREENINGS?: YES

## 2025-02-02 SDOH — HEALTH STABILITY: MENTAL HEALTH: HOW OFTEN DO YOU HAVE SIX OR MORE DRINKS ON ONE OCCASION?: NEVER

## 2025-02-02 SDOH — SOCIAL STABILITY: SOCIAL NETWORK: HOW OFTEN DO YOU ATTEND MEETINGS OF THE CLUBS OR ORGANIZATIONS YOU BELONG TO?: NEVER

## 2025-02-02 SDOH — SOCIAL STABILITY: SOCIAL NETWORK: HOW OFTEN DO YOU ATTEND CHURCH OR RELIGIOUS SERVICES?: NEVER

## 2025-02-02 SDOH — ECONOMIC STABILITY: FOOD INSECURITY: HOW HARD IS IT FOR YOU TO PAY FOR THE VERY BASICS LIKE FOOD, HOUSING, MEDICAL CARE, AND HEATING?: NOT HARD AT ALL

## 2025-02-02 SDOH — SOCIAL STABILITY: SOCIAL INSECURITY: DOES ANYONE TRY TO KEEP YOU FROM HAVING/CONTACTING OTHER FRIENDS OR DOING THINGS OUTSIDE YOUR HOME?: NO

## 2025-02-02 SDOH — HEALTH STABILITY: PHYSICAL HEALTH: ON AVERAGE, HOW MANY MINUTES DO YOU ENGAGE IN EXERCISE AT THIS LEVEL?: 60 MIN

## 2025-02-02 SDOH — SOCIAL STABILITY: SOCIAL INSECURITY: HAVE YOU HAD THOUGHTS OF HARMING ANYONE ELSE?: NO

## 2025-02-02 SDOH — ECONOMIC STABILITY: TRANSPORTATION INSECURITY: IN THE PAST 12 MONTHS, HAS LACK OF TRANSPORTATION KEPT YOU FROM MEDICAL APPOINTMENTS OR FROM GETTING MEDICATIONS?: NO

## 2025-02-02 SDOH — SOCIAL STABILITY: SOCIAL INSECURITY: ARE THERE ANY APPARENT SIGNS OF INJURIES/BEHAVIORS THAT COULD BE RELATED TO ABUSE/NEGLECT?: NO

## 2025-02-02 ASSESSMENT — PATIENT HEALTH QUESTIONNAIRE - PHQ9
1. LITTLE INTEREST OR PLEASURE IN DOING THINGS: NOT AT ALL
SUM OF ALL RESPONSES TO PHQ9 QUESTIONS 1 & 2: 0
2. FEELING DOWN, DEPRESSED OR HOPELESS: NOT AT ALL

## 2025-02-02 ASSESSMENT — ACTIVITIES OF DAILY LIVING (ADL)
HEARING - RIGHT EAR: FUNCTIONAL
ADEQUATE_TO_COMPLETE_ADL: YES
DRESSING YOURSELF: INDEPENDENT
LACK_OF_TRANSPORTATION: NO
PATIENT'S MEMORY ADEQUATE TO SAFELY COMPLETE DAILY ACTIVITIES?: YES
HEARING - LEFT EAR: FUNCTIONAL
JUDGMENT_ADEQUATE_SAFELY_COMPLETE_DAILY_ACTIVITIES: YES
LACK_OF_TRANSPORTATION: NO
FEEDING YOURSELF: INDEPENDENT
WALKS IN HOME: INDEPENDENT
GROOMING: INDEPENDENT
TOILETING: INDEPENDENT
BATHING: INDEPENDENT

## 2025-02-02 ASSESSMENT — LIFESTYLE VARIABLES
SKIP TO QUESTIONS 9-10: 1
SKIP TO QUESTIONS 9-10: 1
HOW OFTEN DO YOU HAVE 6 OR MORE DRINKS ON ONE OCCASION: NEVER
AUDIT-C TOTAL SCORE: 0
HOW OFTEN DO YOU HAVE A DRINK CONTAINING ALCOHOL: NEVER
AUDIT-C TOTAL SCORE: 0
AUDIT-C TOTAL SCORE: 0
HOW MANY STANDARD DRINKS CONTAINING ALCOHOL DO YOU HAVE ON A TYPICAL DAY: PATIENT DOES NOT DRINK

## 2025-02-03 ENCOUNTER — ANESTHESIA EVENT (OUTPATIENT)
Dept: OBSTETRICS AND GYNECOLOGY | Facility: HOSPITAL | Age: 35
End: 2025-02-03
Payer: COMMERCIAL

## 2025-02-03 ENCOUNTER — ANESTHESIA (OUTPATIENT)
Dept: OBSTETRICS AND GYNECOLOGY | Facility: HOSPITAL | Age: 35
End: 2025-02-03
Payer: COMMERCIAL

## 2025-02-03 PROBLEM — Z34.90 TERM PREGNANCY (HHS-HCC): Status: ACTIVE | Noted: 2025-02-03

## 2025-02-03 LAB — TREPONEMA PALLIDUM IGG+IGM AB [PRESENCE] IN SERUM OR PLASMA BY IMMUNOASSAY: NONREACTIVE

## 2025-02-03 PROCEDURE — 7210000002 HC LABOR PER HOUR

## 2025-02-03 PROCEDURE — 10907ZC DRAINAGE OF AMNIOTIC FLUID, THERAPEUTIC FROM PRODUCTS OF CONCEPTION, VIA NATURAL OR ARTIFICIAL OPENING: ICD-10-PCS | Performed by: STUDENT IN AN ORGANIZED HEALTH CARE EDUCATION/TRAINING PROGRAM

## 2025-02-03 PROCEDURE — 2500000004 HC RX 250 GENERAL PHARMACY W/ HCPCS (ALT 636 FOR OP/ED): Performed by: STUDENT IN AN ORGANIZED HEALTH CARE EDUCATION/TRAINING PROGRAM

## 2025-02-03 PROCEDURE — 59409 OBSTETRICAL CARE: CPT | Performed by: STUDENT IN AN ORGANIZED HEALTH CARE EDUCATION/TRAINING PROGRAM

## 2025-02-03 PROCEDURE — 1220000001 HC OB SEMI-PRIVATE ROOM DAILY

## 2025-02-03 PROCEDURE — 2500000001 HC RX 250 WO HCPCS SELF ADMINISTERED DRUGS (ALT 637 FOR MEDICARE OP): Performed by: STUDENT IN AN ORGANIZED HEALTH CARE EDUCATION/TRAINING PROGRAM

## 2025-02-03 PROCEDURE — 51701 INSERT BLADDER CATHETER: CPT

## 2025-02-03 PROCEDURE — 2500000004 HC RX 250 GENERAL PHARMACY W/ HCPCS (ALT 636 FOR OP/ED): Performed by: NURSE ANESTHETIST, CERTIFIED REGISTERED

## 2025-02-03 PROCEDURE — 7100000016 HC LABOR RECOVERY PER HOUR

## 2025-02-03 RX ORDER — METOCLOPRAMIDE 10 MG/1
10 TABLET ORAL EVERY 6 HOURS PRN
Status: DISCONTINUED | OUTPATIENT
Start: 2025-02-03 | End: 2025-02-04 | Stop reason: HOSPADM

## 2025-02-03 RX ORDER — LABETALOL HYDROCHLORIDE 5 MG/ML
20 INJECTION, SOLUTION INTRAVENOUS ONCE AS NEEDED
Status: DISCONTINUED | OUTPATIENT
Start: 2025-02-03 | End: 2025-02-04 | Stop reason: HOSPADM

## 2025-02-03 RX ORDER — OXYTOCIN 10 [USP'U]/ML
10 INJECTION, SOLUTION INTRAMUSCULAR; INTRAVENOUS ONCE AS NEEDED
Status: DISCONTINUED | OUTPATIENT
Start: 2025-02-03 | End: 2025-02-03

## 2025-02-03 RX ORDER — ONDANSETRON 4 MG/1
4 TABLET, FILM COATED ORAL EVERY 6 HOURS PRN
Status: DISCONTINUED | OUTPATIENT
Start: 2025-02-03 | End: 2025-02-04 | Stop reason: HOSPADM

## 2025-02-03 RX ORDER — NIFEDIPINE 10 MG/1
10 CAPSULE ORAL ONCE AS NEEDED
Status: DISCONTINUED | OUTPATIENT
Start: 2025-02-03 | End: 2025-02-03 | Stop reason: SDUPTHER

## 2025-02-03 RX ORDER — IBUPROFEN 600 MG/1
600 TABLET ORAL EVERY 6 HOURS
Status: DISCONTINUED | OUTPATIENT
Start: 2025-02-03 | End: 2025-02-04 | Stop reason: HOSPADM

## 2025-02-03 RX ORDER — OXYTOCIN/0.9 % SODIUM CHLORIDE 30/500 ML
60 PLASTIC BAG, INJECTION (ML) INTRAVENOUS ONCE AS NEEDED
Status: DISCONTINUED | OUTPATIENT
Start: 2025-02-03 | End: 2025-02-03

## 2025-02-03 RX ORDER — SODIUM CHLORIDE, SODIUM LACTATE, POTASSIUM CHLORIDE, CALCIUM CHLORIDE 600; 310; 30; 20 MG/100ML; MG/100ML; MG/100ML; MG/100ML
125 INJECTION, SOLUTION INTRAVENOUS CONTINUOUS
Status: DISCONTINUED | OUTPATIENT
Start: 2025-02-03 | End: 2025-02-03

## 2025-02-03 RX ORDER — ADHESIVE BANDAGE
10 BANDAGE TOPICAL
Status: DISCONTINUED | OUTPATIENT
Start: 2025-02-03 | End: 2025-02-04 | Stop reason: HOSPADM

## 2025-02-03 RX ORDER — TRANEXAMIC ACID 100 MG/ML
1000 INJECTION, SOLUTION INTRAVENOUS ONCE AS NEEDED
Status: DISCONTINUED | OUTPATIENT
Start: 2025-02-03 | End: 2025-02-04 | Stop reason: HOSPADM

## 2025-02-03 RX ORDER — LABETALOL HYDROCHLORIDE 5 MG/ML
20 INJECTION, SOLUTION INTRAVENOUS ONCE AS NEEDED
Status: DISCONTINUED | OUTPATIENT
Start: 2025-02-03 | End: 2025-02-03 | Stop reason: SDUPTHER

## 2025-02-03 RX ORDER — CALCIUM CARBONATE 200(500)MG
500 TABLET,CHEWABLE ORAL DAILY
Status: DISCONTINUED | OUTPATIENT
Start: 2025-02-03 | End: 2025-02-03

## 2025-02-03 RX ORDER — NIFEDIPINE 10 MG/1
10 CAPSULE ORAL ONCE AS NEEDED
Status: DISCONTINUED | OUTPATIENT
Start: 2025-02-03 | End: 2025-02-04 | Stop reason: HOSPADM

## 2025-02-03 RX ORDER — CALCIUM CARBONATE 200(500)MG
500 TABLET,CHEWABLE ORAL 4 TIMES DAILY PRN
Status: DISCONTINUED | OUTPATIENT
Start: 2025-02-03 | End: 2025-02-04 | Stop reason: HOSPADM

## 2025-02-03 RX ORDER — METHYLERGONOVINE MALEATE 0.2 MG/ML
0.2 INJECTION INTRAVENOUS ONCE AS NEEDED
Status: DISCONTINUED | OUTPATIENT
Start: 2025-02-03 | End: 2025-02-04 | Stop reason: HOSPADM

## 2025-02-03 RX ORDER — FERROUS SULFATE 325(65) MG
325 TABLET ORAL
COMMUNITY
End: 2025-02-04 | Stop reason: HOSPADM

## 2025-02-03 RX ORDER — ONDANSETRON 4 MG/1
4 TABLET, FILM COATED ORAL EVERY 6 HOURS PRN
Status: DISCONTINUED | OUTPATIENT
Start: 2025-02-03 | End: 2025-02-03 | Stop reason: SDUPTHER

## 2025-02-03 RX ORDER — LOPERAMIDE HYDROCHLORIDE 2 MG/1
4 CAPSULE ORAL EVERY 2 HOUR PRN
Status: DISCONTINUED | OUTPATIENT
Start: 2025-02-03 | End: 2025-02-04 | Stop reason: HOSPADM

## 2025-02-03 RX ORDER — MISOPROSTOL 200 UG/1
800 TABLET ORAL ONCE AS NEEDED
Status: DISCONTINUED | OUTPATIENT
Start: 2025-02-03 | End: 2025-02-04 | Stop reason: HOSPADM

## 2025-02-03 RX ORDER — OXYTOCIN/0.9 % SODIUM CHLORIDE 30/500 ML
2-30 PLASTIC BAG, INJECTION (ML) INTRAVENOUS CONTINUOUS
Status: DISCONTINUED | OUTPATIENT
Start: 2025-02-03 | End: 2025-02-03

## 2025-02-03 RX ORDER — TRANEXAMIC ACID 10 MG/ML
1000 INJECTION, SOLUTION INTRAVENOUS ONCE AS NEEDED
Status: DISCONTINUED | OUTPATIENT
Start: 2025-02-03 | End: 2025-02-03 | Stop reason: SDUPTHER

## 2025-02-03 RX ORDER — LIDOCAINE HYDROCHLORIDE 10 MG/ML
30 INJECTION, SOLUTION INFILTRATION; PERINEURAL ONCE AS NEEDED
Status: DISCONTINUED | OUTPATIENT
Start: 2025-02-03 | End: 2025-02-04 | Stop reason: HOSPADM

## 2025-02-03 RX ORDER — ACETAMINOPHEN 325 MG/1
975 TABLET ORAL EVERY 6 HOURS
Status: DISCONTINUED | OUTPATIENT
Start: 2025-02-03 | End: 2025-02-04 | Stop reason: HOSPADM

## 2025-02-03 RX ORDER — LOPERAMIDE HYDROCHLORIDE 2 MG/1
4 CAPSULE ORAL EVERY 2 HOUR PRN
Status: DISCONTINUED | OUTPATIENT
Start: 2025-02-03 | End: 2025-02-03 | Stop reason: SDUPTHER

## 2025-02-03 RX ORDER — DIPHENHYDRAMINE HYDROCHLORIDE 50 MG/ML
25 INJECTION INTRAMUSCULAR; INTRAVENOUS EVERY 6 HOURS PRN
Status: DISCONTINUED | OUTPATIENT
Start: 2025-02-03 | End: 2025-02-04 | Stop reason: HOSPADM

## 2025-02-03 RX ORDER — HYDRALAZINE HYDROCHLORIDE 20 MG/ML
5 INJECTION INTRAMUSCULAR; INTRAVENOUS ONCE AS NEEDED
Status: DISCONTINUED | OUTPATIENT
Start: 2025-02-03 | End: 2025-02-04 | Stop reason: HOSPADM

## 2025-02-03 RX ORDER — METOCLOPRAMIDE HYDROCHLORIDE 5 MG/ML
10 INJECTION INTRAMUSCULAR; INTRAVENOUS EVERY 6 HOURS PRN
Status: DISCONTINUED | OUTPATIENT
Start: 2025-02-03 | End: 2025-02-04 | Stop reason: HOSPADM

## 2025-02-03 RX ORDER — NALBUPHINE HYDROCHLORIDE 10 MG/ML
10 INJECTION INTRAMUSCULAR; INTRAVENOUS; SUBCUTANEOUS
Status: DISCONTINUED | OUTPATIENT
Start: 2025-02-03 | End: 2025-02-04 | Stop reason: HOSPADM

## 2025-02-03 RX ORDER — SIMETHICONE 80 MG
80 TABLET,CHEWABLE ORAL 4 TIMES DAILY PRN
Status: DISCONTINUED | OUTPATIENT
Start: 2025-02-03 | End: 2025-02-04 | Stop reason: HOSPADM

## 2025-02-03 RX ORDER — TERBUTALINE SULFATE 1 MG/ML
0.25 INJECTION SUBCUTANEOUS ONCE AS NEEDED
Status: DISCONTINUED | OUTPATIENT
Start: 2025-02-03 | End: 2025-02-04 | Stop reason: HOSPADM

## 2025-02-03 RX ORDER — OXYTOCIN/0.9 % SODIUM CHLORIDE 30/500 ML
60 PLASTIC BAG, INJECTION (ML) INTRAVENOUS
Status: DISCONTINUED | OUTPATIENT
Start: 2025-02-03 | End: 2025-02-03

## 2025-02-03 RX ORDER — HYDRALAZINE HYDROCHLORIDE 20 MG/ML
5 INJECTION INTRAMUSCULAR; INTRAVENOUS ONCE AS NEEDED
Status: DISCONTINUED | OUTPATIENT
Start: 2025-02-03 | End: 2025-02-03 | Stop reason: SDUPTHER

## 2025-02-03 RX ORDER — FAMOTIDINE 40 MG/1
40 TABLET, FILM COATED ORAL DAILY
COMMUNITY
End: 2025-02-04 | Stop reason: HOSPADM

## 2025-02-03 RX ORDER — POLYETHYLENE GLYCOL 3350 17 G/17G
17 POWDER, FOR SOLUTION ORAL 2 TIMES DAILY PRN
Status: DISCONTINUED | OUTPATIENT
Start: 2025-02-03 | End: 2025-02-04 | Stop reason: HOSPADM

## 2025-02-03 RX ORDER — DIPHENHYDRAMINE HCL 25 MG
25 TABLET ORAL EVERY 6 HOURS PRN
Status: DISCONTINUED | OUTPATIENT
Start: 2025-02-03 | End: 2025-02-04 | Stop reason: HOSPADM

## 2025-02-03 RX ORDER — METHYLERGONOVINE MALEATE 0.2 MG/ML
0.2 INJECTION INTRAVENOUS ONCE AS NEEDED
Status: DISCONTINUED | OUTPATIENT
Start: 2025-02-03 | End: 2025-02-03 | Stop reason: SDUPTHER

## 2025-02-03 RX ORDER — CARBOPROST TROMETHAMINE 250 UG/ML
250 INJECTION, SOLUTION INTRAMUSCULAR ONCE AS NEEDED
Status: DISCONTINUED | OUTPATIENT
Start: 2025-02-03 | End: 2025-02-04 | Stop reason: HOSPADM

## 2025-02-03 RX ORDER — MISOPROSTOL 200 UG/1
800 TABLET ORAL ONCE AS NEEDED
Status: DISCONTINUED | OUTPATIENT
Start: 2025-02-03 | End: 2025-02-03 | Stop reason: SDUPTHER

## 2025-02-03 RX ORDER — ONDANSETRON HYDROCHLORIDE 2 MG/ML
4 INJECTION, SOLUTION INTRAVENOUS EVERY 6 HOURS PRN
Status: DISCONTINUED | OUTPATIENT
Start: 2025-02-03 | End: 2025-02-04 | Stop reason: HOSPADM

## 2025-02-03 RX ORDER — DOCUSATE SODIUM 100 MG/1
100 CAPSULE, LIQUID FILLED ORAL 2 TIMES DAILY PRN
COMMUNITY
End: 2025-02-04 | Stop reason: HOSPADM

## 2025-02-03 RX ORDER — LIDOCAINE HYDROCHLORIDE AND EPINEPHRINE 15; 5 MG/ML; UG/ML
INJECTION, SOLUTION EPIDURAL AS NEEDED
Status: DISCONTINUED | OUTPATIENT
Start: 2025-02-03 | End: 2025-02-03

## 2025-02-03 RX ORDER — ONDANSETRON HYDROCHLORIDE 2 MG/ML
4 INJECTION, SOLUTION INTRAVENOUS EVERY 6 HOURS PRN
Status: DISCONTINUED | OUTPATIENT
Start: 2025-02-03 | End: 2025-02-03 | Stop reason: SDUPTHER

## 2025-02-03 RX ORDER — CARBOPROST TROMETHAMINE 250 UG/ML
250 INJECTION, SOLUTION INTRAMUSCULAR ONCE AS NEEDED
Status: DISCONTINUED | OUTPATIENT
Start: 2025-02-03 | End: 2025-02-03 | Stop reason: SDUPTHER

## 2025-02-03 RX ADMIN — Medication 500 MG: at 16:18

## 2025-02-03 RX ADMIN — ACETAMINOPHEN 975 MG: 325 TABLET, FILM COATED ORAL at 08:45

## 2025-02-03 RX ADMIN — LIDOCAINE HYDROCHLORIDE AND EPINEPHRINE 5 ML: 15; 5 INJECTION, SOLUTION EPIDURAL at 00:47

## 2025-02-03 RX ADMIN — IBUPROFEN 600 MG: 600 TABLET, FILM COATED ORAL at 14:11

## 2025-02-03 RX ADMIN — ACETAMINOPHEN 975 MG: 325 TABLET, FILM COATED ORAL at 20:07

## 2025-02-03 RX ADMIN — IBUPROFEN 600 MG: 600 TABLET, FILM COATED ORAL at 08:45

## 2025-02-03 RX ADMIN — ACETAMINOPHEN 975 MG: 325 TABLET, FILM COATED ORAL at 14:10

## 2025-02-03 RX ADMIN — SODIUM CHLORIDE, SODIUM LACTATE, POTASSIUM CHLORIDE, AND CALCIUM CHLORIDE 75 ML/HR: 600; 310; 30; 20 INJECTION, SOLUTION INTRAVENOUS at 05:07

## 2025-02-03 RX ADMIN — ONDANSETRON HYDROCHLORIDE 4 MG: 4 TABLET, FILM COATED ORAL at 02:31

## 2025-02-03 RX ADMIN — IBUPROFEN 600 MG: 600 TABLET, FILM COATED ORAL at 20:07

## 2025-02-03 SDOH — HEALTH STABILITY: MENTAL HEALTH: CURRENT SMOKER: 0

## 2025-02-03 ASSESSMENT — PAIN SCALES - GENERAL
PAINLEVEL_OUTOF10: 0 - NO PAIN
PAINLEVEL_OUTOF10: 0 - NO PAIN
PAINLEVEL_OUTOF10: 3
PAINLEVEL_OUTOF10: 0 - NO PAIN
PAINLEVEL_OUTOF10: 6
PAINLEVEL_OUTOF10: 1
PAINLEVEL_OUTOF10: 1
PAINLEVEL_OUTOF10: 0 - NO PAIN

## 2025-02-03 ASSESSMENT — PAIN DESCRIPTION - LOCATION: LOCATION: PERINEUM

## 2025-02-03 ASSESSMENT — PAIN DESCRIPTION - DESCRIPTORS
DESCRIPTORS: ACHING
DESCRIPTORS: ACHING

## 2025-02-03 NOTE — L&D DELIVERY NOTE
OB Delivery Note  2/3/2025  Shirley Arizmendi  34 y.o.   Vaginal, Spontaneous       Gestational Age: 38w6d  /Para:   Quantitative Blood Loss: Admission to Discharge: 217 mL (2025  9:54 PM - 2/3/2025  7:21 AM)    Jesus Manuel Arizmendi [[id number]]      Labor Events     labor?: No  Rupture date/time: 2/3/2025 0050  Rupture type: Artificial  Fluid color: Clear  Labor type: Spontaneous Onset of Labor  Labor allowed to proceed with plans for an attempted vaginal birth?: Yes  Augmentation: AROM  Augmentation date/time: 2/3/2025 0230  Augmentation indications: Ineffective Contraction Pattern  Complications: None       Labor Event Times    Labor onset date/time: 2025  Dilation complete date/time: 2/3/2025 0615  Start pushing date/time: 2/3/2025 0630       Placenta    Placenta delivery date/time: 2/3/2025 0638  Placenta removal: Expressed  Placenta appearance: Intact  Placenta disposition: discarded       Cord    Vessels: 3 vessels  Complications: Nuchal  Nuchal intervention: reduced  Nuchal cord description: loose nuchal cord  Number of loops: 1  Delayed cord clamping?: Yes  Cord clamped date/time: 2/3/2025 06:36:00  Cord blood disposition: Lab  Gases sent?: No  Stem cell collection (by provider): No       Lacerations    Episiotomy: None       Anesthesia    Method: Epidural       Operative Delivery    Forceps attempted?: No  Vacuum extractor attempted?: No       Shoulder Dystocia    Shoulder dystocia present?: No        Delivery    Time head delivered: 2/3/2025 06:32:00  Birth date/time: 2/3/2025 06:32:00  Delivery type: Vaginal, Spontaneous  Complications: None       Resuscitation    Method: None       Apgars    Living status:   Apgar Component Scores:  1 min.:  5 min.:  10 min.:  15 min.:  20 min.:    Skin color:  0  1       Heart rate:  2  2       Reflex irritability:  2  2       Muscle tone:  2  2       Respiratory effort:  2  2       Total:  8  9       Apgars assigned by: KARYN VALDIVIA RN        Delivery Providers    Delivering clinician: Kaitlin Fair MD   Provider Role     Delivery Nurse     Nursery Nurse                 Called to bedside for delivery. Delivery of fetal head with maternal valsalva. Loose nuchal x1, easily reduced. Anterior shoulder and rest of infant easily followed. Infant placed on maternal abdomen. Cord clamped and cut. Bladder drained with straight catheterization for 100cc clear yellow urine. Placenta delivered with gentle traction, intact on inspection. IV Pitocin initiated to facilitate uterine contraction. Survey of perineum revealed no lacerations. Fundus firm with scant bleeding at conclusion.       Kaitlin Fair MD

## 2025-02-03 NOTE — ANESTHESIA PREPROCEDURE EVALUATION
Patient: Shirley Arizmendi    Evaluation Method: In-person visit    Procedure Information    Date: 02/03/25  Procedure: Labor Consult         Relevant Problems   Neuro   (+) Anxiety   (+) Depression   (+) History of migraine headaches       Clinical information reviewed:    Allergies                NPO Detail:  No data recorded     OB/Gyn Evaluation    Present Pregnancy    Patient is pregnant now.   Obstetric History                Physical Exam    Airway  Mallampati: I     Cardiovascular - normal exam     Dental - normal exam     Pulmonary - normal exam     Abdominal - normal exam             Anesthesia Plan    History of general anesthesia?: no  History of complications of general anesthesia?: no    ASA 2     epidural     The patient is not a current smoker.  Patient was not previously instructed to abstain from smoking on day of procedure.  Patient did not smoke on day of procedure.    Anesthetic plan and risks discussed with patient.

## 2025-02-03 NOTE — ANESTHESIA PROCEDURE NOTES
Epidural Block    Patient location during procedure: OB  Start time: 2/2/2025 10:30 PM  End time: 2/2/2025 10:56 PM  Reason for block: labor analgesia  Staffing  Performed: CRNA   Authorized by: AYANA Adams DNP    Performed by: AYANA Adams DNP    Preanesthetic Checklist  Completed: patient identified, IV checked, risks and benefits discussed, surgical consent, pre-op evaluation, timeout performed and sterile techniques followed  Block Timeout  RN/Licensed healthcare professional reads aloud to the Anesthesia provider and entire team: Patient identity, procedure with side and site, patient position, and as applicable the availability of implants/special equipment/special requirements.  Patient on coagulant treatment: no  Timeout performed at: 2/2/2025 10:40 AM  Block Placement  Patient position: sitting  Prep: Betadine  Sterility prep: cap, drape, gloves, mask and hand  Sedation level: no sedation  Patient monitoring: blood pressure, continuous pulse oximetry and heart rate  Approach: midline  Epidural  Loss of resistance technique: saline  Guidance: landmark technique        Needle  Needle type: Tuohy   Needle gauge: 19  Needle length: 8.9cm  Needle insertion depth: 6 cm  Catheter type: end hole  Catheter size: 19 G  Catheter at skin depth: 10 cm  Catheter securement method: clear occlusive dressing, liquid medical adhesive, stabilization device and surgical tape    Test dose: lidocaine 1.5% with epinephrine 1-to-200,000  Test dose: lidocaine 1.5% with epinephrine 1-to-200,000  Test dose result: no positive test dose    PCEA  Medication concentration used: 0.2% Ropivacaine with 2 mcg/mL Fentanyl  Dose (mL): 4  Lockout (minutes): 20  1-Hour Limit (boluses/hr): 3  Basal Rate: 8        Assessment  Sensory level: T10 bilateral  Block outcome: pain improved  Number of attempts: 1  Events: no positive test dose  Procedure assessment: patient tolerated procedure well with no immediate  complications

## 2025-02-03 NOTE — ANESTHESIA POSTPROCEDURE EVALUATION
Patient: Shirley Arizmendi    Procedure Summary       Date: 02/02/25 Room / Location:     Anesthesia Start: 2230 Anesthesia Stop: 02/03/25 0632    Procedure: labor analgesia Diagnosis:     Scheduled Providers:  Responsible Provider: RENA Adams-KELLY, BRIONNA    Anesthesia Type: Not recorded ASA Status: Not recorded            Anesthesia Type: No value filed.    Vitals Value Taken Time   /83 02/03/25 0647   Temp 36.8 02/03/25 0652   Pulse 98 02/03/25 0647   Resp 16 02/03/25 0652   SpO2 100 % 02/03/25 0646       Anesthesia Post Evaluation    Patient location during evaluation: bedside  Patient participation: complete - patient participated  Level of consciousness: awake and alert  Pain management: adequate  Airway patency: patent  Cardiovascular status: acceptable  Respiratory status: acceptable  Hydration status: acceptable  Postoperative Nausea and Vomiting: none        There were no known notable events for this encounter.

## 2025-02-03 NOTE — PROGRESS NOTES
Intrapartum Progress Note    Assessment/Plan   Shirley Arizmendi is a 34 y.o.  at 38w6d. ALEC: 2025, by Interfaced ALEC.     Continue expectant labor management, will augment with pitocin prn.     Assessment & Plan  Normal labor ([facility])        Subjective   Comfortable with epidural. Requesting AROM.     Objective   Last Vitals:  Temp Pulse Resp BP MAP Pulse Ox   36.4 °C (97.5 °F) 98 21 123/72 93 97 %     Vitals Min/Max Last 24 Hours:  Temp  Min: 36.4 °C (97.5 °F)  Max: 36.4 °C (97.5 °F)  Pulse  Min: 94  Max: 127  Resp  Min: 21  Max: 21  BP  Min: 118/75  Max: 147/91  MAP (mmHg)  Min: 90  Max: 110    Physical Examination:  AAOx3, NAD  Abd soft, gravid, NT  SVE = 4-5/60/-2, AROM for moderate return of clear fluid    FHT = Category 1  [place] = q6-8min

## 2025-02-03 NOTE — LACTATION NOTE
Lactation Consultant Note      Mother decided she would like to pump and bottle feed. Set mother up with a manual pump due to being out of the regular pump packs. Reviewed how to take apart, put back together and cleaning. Mother was able to start pumping and get drops out. Mother has not questions or concerns at this time. Continuing support offered as needed.

## 2025-02-03 NOTE — ADDENDUM NOTE
Addendum  created 02/03/25 0656 by RENA Adams-CRNA, DNP    Clinical Note Signed, Intraprocedure Event deleted, Intraprocedure Event edited, Intraprocedure Staff edited

## 2025-02-03 NOTE — ANESTHESIA PREPROCEDURE EVALUATION
Patient: Shirley Arizmendi    Evaluation Method: In-person visit    Procedure Information    Anesthesia Start Date/Time: 02/02/25 2230  Reason: labor analgesia         Relevant Problems   Neuro   (+) Anxiety   (+) Depression   (+) History of migraine headaches       Clinical information reviewed:    Allergies                NPO Detail:  No data recorded     OB/GYN     PHYSICAL EXAM    Anesthesia Plan

## 2025-02-03 NOTE — H&P
Delayed note due to patient care:   OB Admission H&P    Assessment/Plan    Shirley Arizmendi is a 34 y.o.  at 38w6d, ALEC: 2025, by Interfaced ALEC, who is admitted for Labor.    Plan   -Admit to L&D, consented  -T&S, CBC, and Syphilis  -Epidural at patient request  -Recheck as clinically indicated by maternal or fetal status    Fetal Status  -NST reactive, reassuring   -Presentation cephalic based on vaginal exam  -EFW 42%ile by 36wk US  -GBS negative      Subjective   35yo  at 38w5d presents for contractions. Denies LOF/VB. +fetal movement.     OB History    Para Term  AB Living   7 6 5 1 0 6   SAB IAB Ectopic Multiple Live Births   0 0 0 0 6      # Outcome Date GA Lbr Juan C/2nd Weight Sex Type Anes PTL Lv   7 Current            6  24 34w3d 00:02 / 00:05 2.48 kg F Vag-Spont EPI  KRANTHI      Birth Comments: To NICU      Name: ANA MARIA ARIZMENDI      Apgar1: 8  Apgar5: 9   5 Term            4 Term            3 Term            2 Term            1 Term                No past surgical history on file.    Social History     Tobacco Use    Smoking status: Unknown     Passive exposure: Never    Smokeless tobacco: Not on file   Substance Use Topics    Alcohol use: Not Currently       Allergies   Allergen Reactions    Latex, Natural Rubber Hives    Sulfa (Sulfonamide Antibiotics) Hives       Medications Prior to Admission   Medication Sig Dispense Refill Last Dose/Taking    ferrous sulfate, 325 mg ferrous sulfate, tablet Take 1 tablet (325 mg) by mouth once daily with breakfast.   Past Week    omeprazole (PriLOSEC) 40 mg DR capsule Take 1 capsule (40 mg) by mouth. Do not crush or chew.   Past Week    acetaminophen (Tylenol) 500 mg tablet Take 2 tablets (1,000 mg) by mouth every 6 hours if needed for moderate pain (4 - 6). (Patient not taking: Reported on 2/3/2025) 120 tablet 0 Not Taking    docusate sodium (Colace) 100 mg capsule Take 1 capsule (100 mg) by mouth 2 times a day as needed for  constipation.       famotidine (Pepcid) 40 mg tablet Take 1 tablet (40 mg) by mouth once daily.       folic acid (Folvite) 1 mg tablet Take by mouth once daily. (Patient not taking: Reported on 2/3/2025)   Not Taking    furosemide (Lasix) 20 mg tablet Take 1 tablet (20 mg) by mouth once daily. (Patient not taking: Reported on 2/3/2025) 5 tablet 0 Not Taking    gabapentin (Neurontin) 400 mg capsule Take 1 capsule (400 mg) by mouth once daily. (Patient not taking: Reported on 2/3/2025)   Not Taking    levomilnacipran (Fetzima) 120 mg extended release capsule Take 1 capsule (120 mg) by mouth once daily. (Patient not taking: Reported on 2/3/2025)   Not Taking    nitrofurantoin, macrocrystal-monohydrate, (Macrobid) 100 mg capsule Take by mouth 2 times a day. (Patient not taking: Reported on 2/3/2025)   Not Taking     Objective     Last Vitals  Temp Pulse Resp BP MAP O2 Sat   36.4 °C (97.5 °F) 97 21 123/72 93 96 %     Blood Pressures         2/2/2025  2331 2/2/2025  2336 2/2/2025  2341 2/3/2025  0013 2/3/2025  0043    BP: 118/75 123/78 123/78 120/74 123/72             Physical Exam  General: NAD, mood appropriate  Cardiopulmonary: warm and well perfused, breathing comfortably on room air  Abdomen: Gravid, non-tender  Extremities: Symmetric  Speculum Exam: deferred  Cervix: 4 /70 /-3 on RN exam     Fetal Monitoring  Baseline: 140 bpm, Variability: moderate,  Accelerations: absent and Decelerations: none  Uterine Activity: q5-8 minutes  Interpretation: Category 1    Labs in chart were reviewed.   Results from last 7 days   Lab Units 02/02/25  2230   WBC AUTO x10*3/uL 8.5   HEMOGLOBIN g/dL 11.1*   HEMATOCRIT % 36.5   PLATELETS AUTO x10*3/uL 196   AST U/L 14   ALT U/L 10   CREATININE mg/dL 0.65        Prenatal labs reviewed, not remarkable.

## 2025-02-03 NOTE — CARE PLAN
The patient's goals for the shift include  education    The clinical goals for the shift include  education    Problem: Antepartum  Goal: Maintain pregnancy as long as maternal and/or fetal condition is stable  Outcome: Progressing  Goal: Avoid/minimize constipation  Outcome: Progressing  Goal: No decrease in circulation/VTE  Outcome: Progressing  Goal: FHR remains reassuring  Outcome: Progressing  Goal: Minimize anxiety/maximize coping  Outcome: Progressing     Problem: Vaginal Birth or  Section  Goal: Fetal and maternal status remain reassuring during the birth process  Outcome: Progressing  Goal: Tolerate CRB for IOL placement maintenance until dislodgement/removal 12hrs after placement  Outcome: Progressing  Goal: Prevention of malpresentation/labor dystocia through delivery  Outcome: Progressing  Goal: Demonstrates labor coping techniques through delivery  Outcome: Progressing  Goal: Minimal s/sx of HDP and BP<160/110  Outcome: Progressing  Goal: No s/sx of infection through recovery  Outcome: Progressing  Goal: No s/sx of hemorrhage through recovery  Outcome: Progressing     Problem: Hypertensive Disorder of Pregnancy (HDP)  Goal: Minimal s/sx of HDP and BP<160/110  Outcome: Progressing  Goal: Adequate urine output (0.5 ml/kg/hr)  Outcome: Progressing     Problem: Nausea/Vomiting  Goal: Adequate urine output (0.5 ml/kg/hr)  Outcome: Progressing  Goal: Free from nausea/vomiting  Outcome: Progressing  Goal: Tolerates prescribed diet  Outcome: Progressing  Goal: Weight maintenance or gain  Outcome: Progressing  Goal: Achieve/maintain normal electrolyte level  Outcome: Progressing     Problem: Pain - Adult  Goal: Verbalizes/displays adequate comfort level or baseline comfort level  Outcome: Progressing     Problem: Safety - Adult  Goal: Free from fall injury  Outcome: Progressing     Problem: Discharge Planning  Goal: Discharge to home or other facility with appropriate resources  Outcome:  Progressing     Problem: Chronic Conditions and Co-morbidities  Goal: Patient's chronic conditions and co-morbidity symptoms are monitored and maintained or improved  Outcome: Progressing

## 2025-02-04 ENCOUNTER — PHARMACY VISIT (OUTPATIENT)
Dept: PHARMACY | Facility: CLINIC | Age: 35
End: 2025-02-04
Payer: MEDICAID

## 2025-02-04 VITALS
BODY MASS INDEX: 32.32 KG/M2 | TEMPERATURE: 97.7 F | SYSTOLIC BLOOD PRESSURE: 122 MMHG | HEART RATE: 94 BPM | OXYGEN SATURATION: 97 % | DIASTOLIC BLOOD PRESSURE: 88 MMHG | RESPIRATION RATE: 20 BRPM | HEIGHT: 59 IN

## 2025-02-04 PROBLEM — Z37.9 NORMAL LABOR (HHS-HCC): Status: RESOLVED | Noted: 2025-02-02 | Resolved: 2025-02-04

## 2025-02-04 PROBLEM — Z34.90 TERM PREGNANCY (HHS-HCC): Status: RESOLVED | Noted: 2025-02-03 | Resolved: 2025-02-04

## 2025-02-04 PROCEDURE — 2500000001 HC RX 250 WO HCPCS SELF ADMINISTERED DRUGS (ALT 637 FOR MEDICARE OP): Performed by: OBSTETRICS & GYNECOLOGY

## 2025-02-04 PROCEDURE — RXMED WILLOW AMBULATORY MEDICATION CHARGE

## 2025-02-04 PROCEDURE — 2500000001 HC RX 250 WO HCPCS SELF ADMINISTERED DRUGS (ALT 637 FOR MEDICARE OP): Performed by: STUDENT IN AN ORGANIZED HEALTH CARE EDUCATION/TRAINING PROGRAM

## 2025-02-04 RX ORDER — ACETAMINOPHEN 325 MG/1
975 TABLET ORAL EVERY 6 HOURS PRN
Qty: 60 TABLET | Refills: 0 | Status: SHIPPED | OUTPATIENT
Start: 2025-02-04

## 2025-02-04 RX ORDER — IBUPROFEN 600 MG/1
600 TABLET ORAL EVERY 6 HOURS PRN
Qty: 60 TABLET | Refills: 0 | Status: SHIPPED | OUTPATIENT
Start: 2025-02-04

## 2025-02-04 RX ADMIN — ACETAMINOPHEN 975 MG: 325 TABLET, FILM COATED ORAL at 02:12

## 2025-02-04 RX ADMIN — IBUPROFEN 600 MG: 600 TABLET, FILM COATED ORAL at 08:26

## 2025-02-04 RX ADMIN — Medication 500 MG: at 02:18

## 2025-02-04 RX ADMIN — IBUPROFEN 600 MG: 600 TABLET, FILM COATED ORAL at 02:12

## 2025-02-04 ASSESSMENT — PAIN SCALES - GENERAL
PAINLEVEL_OUTOF10: 0 - NO PAIN
PAINLEVEL_OUTOF10: 4
PAINLEVEL_OUTOF10: 0 - NO PAIN
PAINLEVEL_OUTOF10: 0 - NO PAIN
PAINLEVEL_OUTOF10: 4

## 2025-02-04 ASSESSMENT — PAIN DESCRIPTION - LOCATION
LOCATION: ABDOMEN
LOCATION: ABDOMEN

## 2025-02-04 NOTE — CARE PLAN
The patient's goals for the shift include rest    The clinical goals for the shift include pain management    Over the shift, the patient did not make progress toward the following goals. Barriers to progression include none. Recommendations to address these barriers include n/a.

## 2025-02-04 NOTE — DISCHARGE SUMMARY
Discharge Summary    Admission Date: 2/2/2025  Discharge Date: 2/4/2025    Discharge Diagnosis  Normal labor ([facility])    Hospital Course  Delivery Date: 2/3/2025 6:32 AM  Delivery type: Vaginal, Spontaneous   GA at delivery: 38w6d  Outcome: Living  Anesthesia during delivery: Epidural  Intrapartum complications: None  Feeding method: Breastfeeding Status: Yes (hand pumping)     Procedures: none      Pertinent Physical Exam At Time of Discharge  Normal exam    Last Vitals:  Temp Pulse Resp BP MAP Pulse Ox   36.5 °C (97.7 °F) 94 16 122/88 100 97 %     Discharge Meds     Your medication list        START taking these medications        Instructions Last Dose Given Next Dose Due   ibuprofen 600 mg tablet      Take 1 tablet (600 mg) by mouth every 6 hours if needed for mild pain (1 - 3).              CHANGE how you take these medications        Instructions Last Dose Given Next Dose Due   acetaminophen 325 mg tablet  Commonly known as: Tylenol  What changed:   medication strength  how much to take  reasons to take this      Take 3 tablets (975 mg) by mouth every 6 hours if needed for mild pain (1 - 3).              STOP taking these medications      docusate sodium 100 mg capsule  Commonly known as: Colace        famotidine 40 mg tablet  Commonly known as: Pepcid        ferrous sulfate (325 mg ferrous sulfate) tablet        nitrofurantoin (macrocrystal-monohydrate) 100 mg capsule  Commonly known as: Macrobid        omeprazole 40 mg DR capsule  Commonly known as: PriLOSEC               ASK your doctor about these medications        Instructions Last Dose Given Next Dose Due   Fetzima 120 mg extended release capsule  Generic drug: levomilnacipran           folic acid 1 mg tablet  Commonly known as: Folvite           furosemide 20 mg tablet  Commonly known as: Lasix      Take 1 tablet (20 mg) by mouth once daily.       gabapentin 400 mg capsule  Commonly known as: Neurontin                     Where to Get Your Medications         These medications were sent to [facility]  [address], [place] [address]      Hours: 9 AM to 6 PM Mon-Fri, 9 AM to 1 PM Sat Phone: [phone]   acetaminophen 325 mg tablet  ibuprofen 600 mg tablet          Complications Requiring Follow-Up  none    Test Results Pending At Discharge  Pending Labs       No current pending labs.            Outpatient Follow-Up  Future Appointments   Date Time Provider Department [place]   2/12/2025  1:45 PM Parish Cosme, PT [id number] [facility]   2/21/2025  3:30 PM Regis Polanco MD [id number] [facility]             Gilda Johns MD

## 2025-02-04 NOTE — PROGRESS NOTES
Postpartum Progress Note    Assessment/Plan   Shirley Arizmendi is a 34 y.o., , who delivered at 38w6d gestation and is now postpartum day 1 s/p , doing well.    Routine care  Requests circ for baby, consent signed.  Discharge home  Follow up in 6 weeks        Hospital course: no complications  Vaginal Birth  Patient is currently breastfeedingThe patient's blood type is O POS. The baby's blood type is O POS. Rhogam is not indicated.    Subjective   Feeling well, minimal cramping, light lochia, hoping to go home today    Objective   Allergies:   Latex, natural rubber and Sulfa (sulfonamide antibiotics)         Last Vitals:  Temp Pulse Resp BP MAP Pulse Ox   36.5 °C (97.7 °F) 94 16 122/88 100 97 %     Vitals Min/Max Last 24 Hours:  Temp  Min: 36.1 °C (97 °F)  Max: 37.1 °C (98.7 °F)  Pulse  Min: 83  Max: 109  Resp  Min: 16  Max: 18  BP  Min: 116/66  Max: 143/67  MAP (mmHg)  Min: 84  Max: 100    Intake/Output:     Intake/Output Summary (Last 24 hours) at 2025 0828  Last data filed at 2/3/2025 0935  Gross per 24 hour   Intake --   Output 600 ml   Net -600 ml       Physical Exam:  GEN: Well appearing, Alert and oriented  HEENT: normocephalic, atraumatic  Abd: soft, NT, ND, fundus firm and nontender  Ext: No edema, nontender

## 2025-02-04 NOTE — LACTATION NOTE
Lactation Consultant Note      Met with mother to see how hand pumping was going. Mother said she does not like the hand pump. We still do not have regular pump parts. Mother said she is going home today and will start pumping with her pump at home. Reviewed milk storage, pumping, cleaning and sterilizing.

## 2025-02-04 NOTE — CONSULTS
Reason For Consult  Hx of anxiety and depression    History Of Present Illness  Shirley Arizmendi is a 34 y.o. female presenting with IOL.     Past Medical History  She has a past medical history of Other conditions influencing health status.    Social History  She reports that she does not currently use alcohol. She reports that she does not use drugs. No history on file for tobacco use.    MSW met with pt at bedside to introduce self and provide support resources. MOB was alone in the room and baby is in the nursery. She is hopeful for dc today. This is MOB 7th child. They range from 18 years old to 11 months. MOB was in a positive mood and appropriate.     MSW reviewed the need to use the crib card as proof of birth in lieu of the birth certificate that will arrive in a few weeks. Instructed Pt to add baby to her commercial insurance asap and they will likely ask for proof of birth, this is when she can use crib card. Discussed ABC's of safe sleep and smoking safety protocol.     DANIELA has reliable transportation and works full time. She plans to use the same pediatrician as she does for her other children. FOB is currently incarcerated and she is in the middle of divorce. Despite that stressor DANIELA seems to be doing really well emotionally.  She has a hx of anxiety and depression. She went off meds after her last child was born and has been doing well. Reviewed Baby Blues vs PPD and provided resources for support. Encouraged MOB if she finds herself struggling to reach out.     MOB AND BABY CLEARED BY SOCIAL WORK FOR DC    Erica MARCUS, MSW

## 2025-02-21 ENCOUNTER — OFFICE VISIT (OUTPATIENT)
Dept: ORTHOPEDIC SURGERY | Facility: CLINIC | Age: 35
End: 2025-02-21
Payer: COMMERCIAL

## 2025-02-21 ENCOUNTER — HOSPITAL ENCOUNTER (OUTPATIENT)
Dept: RADIOLOGY | Facility: CLINIC | Age: 35
Discharge: HOME | End: 2025-02-21
Payer: COMMERCIAL

## 2025-02-21 DIAGNOSIS — M25.872 IMPINGEMENT OF LEFT ANKLE JOINT: ICD-10-CM

## 2025-02-21 DIAGNOSIS — S93.432A ANKLE SYNDESMOSIS DISRUPTION, LEFT, INITIAL ENCOUNTER: Primary | ICD-10-CM

## 2025-02-21 DIAGNOSIS — S93.432A ANKLE SYNDESMOSIS DISRUPTION, LEFT, INITIAL ENCOUNTER: ICD-10-CM

## 2025-02-21 DIAGNOSIS — S82.392A CLOSED FRACTURE OF POSTERIOR MALLEOLUS OF LEFT TIBIA, INITIAL ENCOUNTER: ICD-10-CM

## 2025-02-21 PROCEDURE — 99214 OFFICE O/P EST MOD 30 MIN: CPT | Performed by: STUDENT IN AN ORGANIZED HEALTH CARE EDUCATION/TRAINING PROGRAM

## 2025-02-21 PROCEDURE — 73610 X-RAY EXAM OF ANKLE: CPT | Mod: LT

## 2025-02-21 ASSESSMENT — PAIN DESCRIPTION - DESCRIPTORS: DESCRIPTORS: ACHING;DISCOMFORT;SHARP

## 2025-02-21 ASSESSMENT — PAIN SCALES - GENERAL: PAINLEVEL_OUTOF10: 6

## 2025-02-21 ASSESSMENT — PAIN - FUNCTIONAL ASSESSMENT: PAIN_FUNCTIONAL_ASSESSMENT: 0-10

## 2025-02-22 NOTE — PROGRESS NOTES
ORTHOPAEDIC SURGERY OUTPATIENT PROGRESS NOTE    Chief Complaint:  Left ankle pain    History Of Present Illness  Shirley Arizmendi is a 34 y.o. female who presents for evaluation of left ankle pain, self-referred.  Patient initially sustained an injury to her ankle from 10/30/2024 in a relatively plantarflexed position.  She was seen in an urgent care setting where imaging was obtained and she was recommended for outpatient follow-up.  Patient unfortunately persisted with pain.  She has been taking anti-inflammatories including ibuprofen.  She is currently pregnant and has 6 children at home.  She has continued to work with severe pain.  Typical pain is reported as 3 out of 10 at present but can be severe at times.  Patient reports she has a high pain threshold and is having difficulty putting full weight on her ankle due to pain.  She is scheduled for induction and several weeks as well as to begin physical therapy.    02/21/2025: Patient returns for follow-up of her left ankle pain.  She has apparently had an uncomplicated delivery.  She continues with 6 out of 10 pain in her ankle.  She has noticed some improvement in pain since giving birth but has had persistent, 6 out of 10 pain that is certainly worsened with carrying her children.  Pain is made worse with walking and standing.     Past Medical History  Past Medical History:   Diagnosis Date    Other conditions influencing health status     No significant past surgical history       Surgical History  Recent Surgeries in Orthopaedic Surgery            No cases to display             Social History  Social History     Socioeconomic History    Marital status: Single   Tobacco Use    Smoking status: Unknown     Passive exposure: Never   Vaping Use    Vaping status: Unknown   Substance and Sexual Activity    Alcohol use: Not Currently    Drug use: Never     Social Drivers of Health     Financial Resource Strain: Low Risk  (2/2/2025)    Overall Financial Resource  Strain (CARDIA)     Difficulty of Paying Living Expenses: Not hard at all   Food Insecurity: No Food Insecurity (2/2/2025)    Hunger Vital Sign     Worried About Running Out of Food in the Last Year: Never true     Ran Out of Food in the Last Year: Never true   Transportation Needs: No Transportation Needs (2/2/2025)    PRAPARE - Transportation     Lack of Transportation (Medical): No     Lack of Transportation (Non-Medical): No   Physical Activity: Sufficiently Active (2/2/2025)    Exercise Vital Sign     Days of Exercise per Week: 7 days     Minutes of Exercise per Session: 60 min   Stress: No Stress Concern Present (2/2/2025)    [employer] [place] [employer] - Occupational Stress Questionnaire     Feeling of Stress : Not at all   Social Connections: Unknown (2/2/2025)    Social Connection and Isolation Panel [NHANES]     Frequency of Communication with Friends and Family: Three times a week     Frequency of Social Gatherings with Friends and Family: Three times a week     Attends Holiness Services: Never     Active Member of Clubs or Organizations: No     Attends Club or Organization Meetings: Never     Marital Status: Patient declined   Intimate Partner Violence: Not At Risk (2/2/2025)    Humiliation, Afraid, Rape, and Kick questionnaire     Fear of Current or Ex-Partner: No     Emotionally Abused: No     Physically Abused: No     Sexually Abused: No       Family History  No family history on file.     Allergies  Allergies   Allergen Reactions    Latex, Natural Rubber Hives    Sulfa (Sulfonamide Antibiotics) Hives       Review of Systems  REVIEW OF SYSTEMS  Constitutional: no unplanned weight loss  Psychiatric: no suicidal ideation  ENT: no vision changes, no sinus problems  Pulmonary: no shortness of breath  Lymphatic: no enlarged lymph nodes  Cardiovascular: no chest pain or shortness of breath  Gastrointestinal: no stomach problems  Genitourinary: no dysuria   Skin: no rashes  Endocrine: no  thyroid problems  Neurological: no headache, no numbness  Hematological: no easy bruising  Musculoskeletal: Left ankle pain     Physical Exam  PHYSICAL EXAMINATION  Constitutional Exam: well developed and well nourished  Psychiatric Exam: alert and oriented, appropriate mood and behavior  Eye Exam: EOMI  Pulmonary Exam: breathing non-labored, no apparent distress  Lymphatic exam: no appreciable lymphadenopathy in the lower extremities  Cardiovascular exam: RRR to peripheral palpation, DP pulses 2+, PT 2+, toes are pink with good capillary refill, no pitting edema  Skin exam: no open lesions, rashes, abrasions or ulcerations  Neurological exam: sensation to light touch intact in both lower extremities in peripheral and dermatomal distributions (except for any abnormalities noted in musculoskeletal exam)    Musculoskeletal exam: Left lower extremity examination.  Patient pain continues to localize diffusely about the ankle with tenderness to palpation posterior medially and posterior laterally.  Also with focal tenderness to palpation along the anterior joint line and AITFL.  Nontender to palpation of the ATFL, CFL and peroneal tendons. Patient has sensation intact to light touch grossly in a saphenous, sural, superficial peroneal, deep peroneal and tibial nerve distribution.  Patient has 5 out of 5 strength in plantarflexion, dorsiflexion and EHL. Patient has 2+ DP/PT pulse palpated.  Nontender to palpation proximal fibula.     Last Recorded Vitals  Last menstrual period 07/04/2023, currently breastfeeding.    Laboratory Results  No results found for this or any previous visit (from the past 24 hours).     Radiology Results  X-ray imaging 3 view weightbearing left ankle reviewed and independently evaluated by me on 02/21/2025 demonstrates no acute fracture or dislocation, there is continued note of periosteal reaction of the distal tibia the posterior malleolus, ?  Posterior malleolar cortical step-off and relative  posterior station of the fibula versus projectional differences.    Previously reviewed imaging: CT of the left ankle reviewed from 11/27/2024 and independently evaluated by me on 01/10/2025 demonstrates callus healing about comminuted posterior malleolar fracture, suggestion of anterior syndesmotic avulsion fracture.    Assessment/Plan:  34-year-old female who in my impression has left ankle pain secondary to apparent hyper plantarflexion variant distal fibula fracture with findings suggestive of syndesmotic injury from 10/30/2024 and ongoing ankle impingement.  I again reviewed the diagnosis and treatment options extensively with the patient.  Given her ongoing pain and injury pattern, I would recommend a left ankle arthroscopy with syndesmotic stabilization and procedures as indicated. I reviewed that I would not be able to make them a new or normal extremity. Risks included but are not limited to infection, wound healing complications, need for further surgery, persistent or worsening pain, postoperative stiffness, bleeding, blood loss requiring a blood transfusion, neurovascular injury, mal- or non-union, recurrent deformity, potential for development of posttraumatic osteoarthritis, hardware failure, hardware pain, failure of the procedure, complications of anesthesia, stroke, DVT/PE, myocardial infarction and death. Benefits included decreased pain and improved function. Alternatives included continued conservative management which I would not recommend given the patient's ongoing pain. The patient voiced understanding of the risks, benefits and alternatives.  I reviewed the typical postoperative recovery course.  I would plan to see the patient back approximately 2 weeks following surgery for a wound check out of plaster.  Upon return patient would not require further imaging.    Surgical Discussion/Plan    We discussed the diagnosis, prognosis, and all treatment options including those non-operative and  surgical, along with respective risks/benefits/recovery.  The patient has failed an extensive, appropriate course of non-operative care and persists with symptoms, functional limitations, and negatively effected quality of life.  The patient would like to pursue surgical intervention.     We discussed the in's and out's of surgery.  It would be done as ambulatory/SDS status, anesthesia: [facility] + GETA, and pre-op testing: PAT visit to be setup.     Surgery will be setup at the patient's convenience, paperwork is completed today.    A significant amount of time was also spent in counseling and coordination of care activities, which specifically included discussing/counseling the patient/family on the above specifics of surgery/risks/benefits/goals/expectations/recovery process and alternative treatments as detailed above and any other necessary post-op planning needed to ensure excellent patient care and patient safety.    Regis Polanco MD, [place]  Department of Orthopaedic Surgery  [facility]    The diagnosis and treatment plan were reviewed with the patient. All questions were answered. The patient verbalized understanding of the treatment plan. There were no barriers to understanding identified.    Note dictated with Gear6 software.  Completed without full type editing and sent to avoid delay.

## 2025-04-07 ENCOUNTER — PATIENT MESSAGE (OUTPATIENT)
Dept: ORTHOPEDIC SURGERY | Facility: CLINIC | Age: 35
End: 2025-04-07
Payer: COMMERCIAL

## 2025-04-15 ENCOUNTER — TELEPHONE (OUTPATIENT)
Dept: PREADMISSION TESTING | Facility: HOSPITAL | Age: 35
End: 2025-04-15
Payer: COMMERCIAL

## 2025-04-17 ENCOUNTER — PRE-ADMISSION TESTING (OUTPATIENT)
Dept: PREADMISSION TESTING | Facility: HOSPITAL | Age: 35
End: 2025-04-17
Payer: COMMERCIAL

## 2025-04-17 ENCOUNTER — DOCUMENTATION (OUTPATIENT)
Dept: PREADMISSION TESTING | Facility: HOSPITAL | Age: 35
End: 2025-04-17

## 2025-04-17 ENCOUNTER — APPOINTMENT (OUTPATIENT)
Dept: ORTHOPEDIC SURGERY | Facility: CLINIC | Age: 35
End: 2025-04-17
Payer: COMMERCIAL

## 2025-04-17 ENCOUNTER — APPOINTMENT (OUTPATIENT)
Dept: LAB | Facility: HOSPITAL | Age: 35
End: 2025-04-17
Payer: COMMERCIAL

## 2025-04-17 VITALS
WEIGHT: 147.71 LBS | SYSTOLIC BLOOD PRESSURE: 113 MMHG | BODY MASS INDEX: 29 KG/M2 | DIASTOLIC BLOOD PRESSURE: 77 MMHG | OXYGEN SATURATION: 98 % | HEIGHT: 60 IN | RESPIRATION RATE: 18 BRPM | TEMPERATURE: 98 F | HEART RATE: 79 BPM

## 2025-04-17 DIAGNOSIS — Z01.818 PREOP TESTING: Primary | ICD-10-CM

## 2025-04-17 LAB
ALBUMIN SERPL BCP-MCNC: 4.4 G/DL (ref 3.4–5)
ALP SERPL-CCNC: 110 U/L (ref 33–110)
ALT SERPL W P-5'-P-CCNC: 29 U/L (ref 7–45)
ANION GAP SERPL CALC-SCNC: 14 MMOL/L (ref 10–20)
AST SERPL W P-5'-P-CCNC: 25 U/L (ref 9–39)
BASOPHILS # BLD AUTO: 0.04 X10*3/UL (ref 0–0.1)
BASOPHILS NFR BLD AUTO: 0.7 %
BILIRUB SERPL-MCNC: 0.7 MG/DL (ref 0–1.2)
BUN SERPL-MCNC: 8 MG/DL (ref 6–23)
CALCIUM SERPL-MCNC: 9 MG/DL (ref 8.6–10.3)
CHLORIDE SERPL-SCNC: 105 MMOL/L (ref 98–107)
CO2 SERPL-SCNC: 23 MMOL/L (ref 21–32)
CREAT SERPL-MCNC: 0.66 MG/DL (ref 0.5–1.05)
EGFRCR SERPLBLD CKD-EPI 2021: >90 ML/MIN/1.73M*2
EOSINOPHIL # BLD AUTO: 0.07 X10*3/UL (ref 0–0.7)
EOSINOPHIL NFR BLD AUTO: 1.3 %
ERYTHROCYTE [DISTWIDTH] IN BLOOD BY AUTOMATED COUNT: 17.3 % (ref 11.5–14.5)
GLUCOSE SERPL-MCNC: 88 MG/DL (ref 74–99)
HCT VFR BLD AUTO: 41.4 % (ref 36–46)
HGB BLD-MCNC: 13.8 G/DL (ref 12–16)
IMM GRANULOCYTES # BLD AUTO: 0.01 X10*3/UL (ref 0–0.7)
IMM GRANULOCYTES NFR BLD AUTO: 0.2 % (ref 0–0.9)
LYMPHOCYTES # BLD AUTO: 2.78 X10*3/UL (ref 1.2–4.8)
LYMPHOCYTES NFR BLD AUTO: 49.7 %
MCH RBC QN AUTO: 27.4 PG (ref 26–34)
MCHC RBC AUTO-ENTMCNC: 33.3 G/DL (ref 32–36)
MCV RBC AUTO: 82 FL (ref 80–100)
MONOCYTES # BLD AUTO: 0.31 X10*3/UL (ref 0.1–1)
MONOCYTES NFR BLD AUTO: 5.5 %
NEUTROPHILS # BLD AUTO: 2.38 X10*3/UL (ref 1.2–7.7)
NEUTROPHILS NFR BLD AUTO: 42.6 %
NRBC BLD-RTO: 0 /100 WBCS (ref 0–0)
PLATELET # BLD AUTO: 242 X10*3/UL (ref 150–450)
POTASSIUM SERPL-SCNC: 4.4 MMOL/L (ref 3.5–5.3)
PROT SERPL-MCNC: 6.8 G/DL (ref 6.4–8.2)
RBC # BLD AUTO: 5.03 X10*6/UL (ref 4–5.2)
SODIUM SERPL-SCNC: 138 MMOL/L (ref 136–145)
WBC # BLD AUTO: 5.6 X10*3/UL (ref 4.4–11.3)

## 2025-04-17 PROCEDURE — 36415 COLL VENOUS BLD VENIPUNCTURE: CPT

## 2025-04-17 PROCEDURE — 80053 COMPREHEN METABOLIC PANEL: CPT

## 2025-04-17 PROCEDURE — 99204 OFFICE O/P NEW MOD 45 MIN: CPT

## 2025-04-17 PROCEDURE — 87081 CULTURE SCREEN ONLY: CPT | Mod: AHULAB | Performed by: NURSE PRACTITIONER

## 2025-04-17 PROCEDURE — 85025 COMPLETE CBC W/AUTO DIFF WBC: CPT

## 2025-04-17 RX ORDER — CHLORHEXIDINE GLUCONATE ORAL RINSE 1.2 MG/ML
15 SOLUTION DENTAL AS NEEDED
Qty: 473 ML | Refills: 0 | Status: SHIPPED | OUTPATIENT
Start: 2025-04-17 | End: 2025-04-19

## 2025-04-17 ASSESSMENT — ENCOUNTER SYMPTOMS
CARDIOVASCULAR NEGATIVE: 1
ENDOCRINE NEGATIVE: 1
CONSTITUTIONAL NEGATIVE: 1
GASTROINTESTINAL NEGATIVE: 1
EYES NEGATIVE: 1
NECK NEGATIVE: 1
NEUROLOGICAL NEGATIVE: 1
RESPIRATORY NEGATIVE: 1

## 2025-04-17 NOTE — PREPROCEDURE INSTRUCTIONS
Medication List            Accurate as of April 17, 2025  3:02 PM. Always use your most recent med list.                acetaminophen 325 mg tablet  Commonly known as: Tylenol  Take 3 tablets (975 mg) by mouth every 6 hours if needed for mild pain (1 - 3).  Additional Medication Adjustments for Surgery: Other (Comment)  Notes to patient: Pt states she is not taking     cholecalciferol 50 mcg (2,000 units) capsule  Commonly known as: Vitamin D-3  Additional Medication Adjustments for Surgery: Other (Comment)  Notes to patient: Pt states she is not taking     Colace 100 mg capsule  Generic drug: docusate sodium  Additional Medication Adjustments for Surgery: Other (Comment)  Notes to patient: Pt states she is not taking     famotidine 40 mg tablet  Commonly known as: Pepcid  Additional Medication Adjustments for Surgery: Other (Comment)  Notes to patient: Pt states she is not taking     ferrous sulfate 325 mg (65 mg elemental) tablet  Additional Medication Adjustments for Surgery: Other (Comment)  Notes to patient: Pt states she is not taking     Fetzima 120 mg extended release capsule  Generic drug: levomilnacipran  Additional Medication Adjustments for Surgery: Other (Comment)  Notes to patient: Pt states she is not taking     fluticasone 50 mcg/actuation nasal spray  Commonly known as: Flonase  Additional Medication Adjustments for Surgery: Other (Comment)  Notes to patient: Pt states she is not taking     folic acid 1 mg tablet  Commonly known as: Folvite  Additional Medication Adjustments for Surgery: Other (Comment)  Notes to patient: Pt states she is not taking     gabapentin 400 mg capsule  Commonly known as: Neurontin  Additional Medication Adjustments for Surgery: Other (Comment)  Notes to patient: Pt states she is not taking     ibuprofen 600 mg tablet  Take 1 tablet (600 mg) by mouth every 6 hours if needed for mild pain (1 - 3).  Additional Medication Adjustments for Surgery: Other (Comment)  Notes to  patient: Pt states she is not taking     metoclopramide 10 mg tablet  Commonly known as: Reglan  Additional Medication Adjustments for Surgery: Other (Comment)  Notes to patient: Pt states she is not taking     nitrofurantoin (macrocrystal-monohydrate) 100 mg capsule  Commonly known as: Macrobid  Additional Medication Adjustments for Surgery: Other (Comment)  Notes to patient: Pt states she is not taking     omeprazole 40 mg DR capsule  Commonly known as: PriLOSEC  Additional Medication Adjustments for Surgery: Other (Comment)  Notes to patient: Pt states she is not taking     ondansetron ODT 4 mg disintegrating tablet  Commonly known as: Zofran-ODT  Additional Medication Adjustments for Surgery: Other (Comment)  Notes to patient: Pt states she is not taking     rizatriptan 10 mg tablet  Commonly known as: Maxalt  Additional Medication Adjustments for Surgery: Other (Comment)  Notes to patient: Pt states she is not taking     sodium chloride 0.65 % nasal spray  Commonly known as: Ocean  Additional Medication Adjustments for Surgery: Other (Comment)  Notes to patient: Pt states she is not taking                          Preoperative Deep Breathing Exercises  Why it is important to do deep breathing exercises before my surgery?  Deep breathing exercises strengthen your breathing muscles.  This helps you to recover after your surgery and decreases the chance of breathing complications.  How are the deep breathing exercises done?  Sit straight with your back supported.  Breathe in deeply and slowly through your nose. Your lower rib cage should expand and your abdomen may move forward.  Hold that breath for 3 to 5 seconds.  Breathe out through pursed lips, slowly and completely.  Rest and repeat 10 times every hour while awake.  Rest longer if you become dizzy or lightheaded.        CONTACT SURGEON'S OFFICE IF YOU DEVELOP:  * Fever = 100.4 F   * New respiratory symptoms (e.g. cough, shortness of breath, respiratory  distress, sore throat)  * Recent loss of taste or smell  *Flu like symptoms such as headache, fatigue or gastrointestinal symptoms  * You develop any open sores, shingles, burning or painful urination   AND/OR:  * You no longer wish to have the surgery.  * Any other personal circumstances change that may lead to the need to cancel or defer this surgery.  *You were admitted to any hospital within one week of your planned procedure.    SMOKING:  *Quitting smoking can make a huge difference to your health and recovery from surgery.    *If you need help with quitting, call 4-188-QUIT-NOW.    THE DAY OF SURGERY:  *Do not eat any food after midnight the night before your surgery.   *YOU MUST drink 14 OUNCES of clear liquids TWO hours before your instructed ARRIVAL TIME to the hospital. This includes water, black tea/coffee (no milk or cream), apple juice, clear broth and electrolyte drinks (Gatorade).  Please avoid clear liquids that are red in color.   *You may chew gum/mints up to TWO hours before your surgery/procedure.    SURGICAL TIME:  *You will be contacted between 2 p.m. and 6 p.m. the business day before your surgery with your arrival time.  *If you haven't received a call by 6pm, call [phone].  *Scheduled surgery times may change and you will be notified if this occurs-check your personal voicemail for any updates.    ON THE MORNING OF SURGERY:  *Wear comfortable, loose fitting clothing.   *Do not use moisturizers, creams, lotions or perfume.  *All jewelry and valuables should be left at home.  *Prosthetic devices such as contact lenses, hearing aids, dentures, eyelash extensions, hairpins and body piercing must be removed before surgery.    BRING WITH YOU:  *Photo ID and insurance card  *Current list of medications and allergies  *Pacemaker/Defibrillator/Heart stent cards  *CPAP machine and mask  *Slings/splints/crutches  *Copy of your complete Advanced Directive/DHPOA-if applicable  *Neurostimulator  implant remote    PARKING AND ARRIVAL:  *Check in at the Main Entrance desk and let them know you are here for surgery.  *You will be directed to the 2nd floor surgical waiting area.    IF YOU ARE HAVING OUTPATIENT/SAME DAY SURGERY:  *A responsible adult MUST accompany you at the time of discharge and stay with you for 24 hours after your surgery.  *You may NOT drive yourself home after surgery.  *You may use a taxi or ride sharing service ([employer], [employer]) to return home ONLY if you are accompanied by a friend or family member.  *Instructions for resuming your medications will be provided by your surgeon.        Patient Information: Oral/Dental Rinse  **This is a prescription; pick it up at your preferred local pharmacy **  What is oral/dental rinse?   It is a mouthwash. It is a way of cleaning the mouth with a germ killing solution before your surgery.  The solution contains chlorhexidine, commonly known as CHG.   It is used inside the mouth to kill a bacteria known as Staphylococcus aureus.  Let your doctor know if you are allergic to Chlorhexidine.    Why do I need to use CHG oral/dental rinse?  The CHG oral/dental rinse helps to kill a bacteria in your mouth known a Staphylococcus aureus.     This reduces the risk of infection at the surgical site.      Using your CHG oral/dental rinse  STEPS:  Use your CHG oral/dental rinse after you brush your teeth the night before (at bedtime) and the morning of your surgery.  Follow all directions on your prescription label.    Use the cap on the container to measure 15ml (fill cap to fill line)  Swish (gargle if you can) the mouthwash in your mouth for at least 30 seconds, (do not to swallow) spit out  After you use your CHG rinse, do not rinse your mouth with water, drink or eat.  Please refer to prescription label for the appropriate time to resume oral intake  Dental rinse comes in one size bottle: 473ml ~16oz.  You will have leftover    rinse, discard after this  use.    What side effects might I have using the CHG oral/dental rinse?  CHG rinse will stick to plaque on the teeth.  Brush and floss just before use.  Teeth brushing will help avoid staining of plaque during use.    Who should I contact if I have questions about the CHG oral/dental rinse?  Please call [facility], Preadmission Testing at [phone] if you have any questions      Home Preoperative Antibacterial Shower     What is a home preoperative antibacterial shower?  This shower is a way of cleaning the skin with a germ killing soap before surgery.  The soap contains chlorhexidine, commonly known as CHG.  CHG is a soap for your skin with germ killing ability.  Let your doctor know if you are allergic to chlorhexidine.    Why do I need to take a preoperative antibacterial shower?  Skin is not sterile.  It is best to try to make your skin as free of germs as possible before surgery.  Proper cleansing with a germ killing soap before surgery can lower the number of germs on your skin.  This helps to reduce the risk of infection at the surgical site.  Following the instructions listed below will help you prepare your skin for surgery.      How do I use the CHG skin cleanser?  Steps:  Begin using your CHG soap five days before your scheduled surgery on ________________________.    Days 1-4 Shower before bed:  Wash your face and genitals with your normal soap and rinse.           2.    Apply the CHG soap to a clean wet washcloth.  Turn the water off or move away                From the water spray to avoid premature rinsing of the CHG soap as you are applying.     3.   Lather your entire body from the neck down.  Do not use on your face or genitals.  4. Pay special attention to the area(s) where your incision(s) will be located unless they are on your face.  Avoid scrubbing your skin too hard.  The important point is to have the CHG soap sit on your skin for 3 minutes.    When the 3  minutes are up, turn on the water and rinse the CHG soap off your body completely.   Pat yourself dry with a clean, freshly-laundered towel.  Dress in clean, freshly laundered night clothes.    Be sure to change bed sheets and blankets at least on the first night of CHG body wash use. May change linens every night of the above protocol for maximum benefit.   Day 5:  Last shower is the morning of surgery: Follow above Instructions.    NOTE:        *Keep CHG soap out of eyes and ear canals   *DO NOT wash with regular soap on your body after you have used the CHG soap solution  *DO NOT apply powders, lotions, or perfume.  *Deodorant may be used days 1-4, BUT NOT the day of surgery    Who should I contact if I have any questions regarding the use of CHG soap?  Call the [facility], Preadmission Testing at [phone] if you have any questions.              Patient Information: Pre-Operative Infection Prevention Measures     Why did I have my nose, under my arms and groin swabbed?  The purpose of the swab is to identify Staphylococcus aureus inside your nose or on your skin.  The swab was sent to the laboratory for culture.  A positive swab/culture for Staphylococcus aureus is called colonization or carriage.      What is Staphylococcus aureus?  Staphylococcus aureus, also known as “staph”, is a germ found on the skin or in the nose of healthy people.  Sometimes Staphylococcus aureus can get into the body and cause an infection.  This can be minor (such as pimples, boils or other skin problems).  It might also be serious (such as blood infection, pneumonia or a surgical site infection).    What is Staphylococcus aureus colonization or carriage?  Colonization or carriage means that a person has the germ but is not sick from it.  These bacteria can be spread on the hands or when breathing or sneezing.    How is Staphylococcus aureus spread?  It is most often spread by close contact with a  person or item that carries it.    What happens if my culture is positive for Staphylococcus aureus?  Your doctor/medical team will use this information to guide any antibiotic treatment which may be necessary.  Regardless of the culture results, we will clean the inside of your nose with a betadine swab just before you have your surgery.      Will I get an infection if I have Staphylococcus aureus in my nose or on my skin?  Anyone can get an infection with Staphylococcus aureus.  However, the best way to reduce your risk of infection is to follow the instructions provided to you for the use of your CHG soap and dental rinse.        Who should I contact if I have any questions?  Call the [facility], Preadmission Testing at [phone] if you have any questions.          Patient Information: Oral/Dental Rinse  **This is a prescription; pick it up at your preferred local pharmacy **  What is oral/dental rinse?   It is a mouthwash. It is a way of cleaning the mouth with a germ killing solution before your surgery.  The solution contains chlorhexidine, commonly known as CHG.   It is used inside the mouth to kill a bacteria known as Staphylococcus aureus.  Let your doctor know if you are allergic to Chlorhexidine.    Why do I need to use CHG oral/dental rinse?  The CHG oral/dental rinse helps to kill a bacteria in your mouth known a Staphylococcus aureus.     This reduces the risk of infection at the surgical site.      Using your CHG oral/dental rinse  STEPS:  Use your CHG oral/dental rinse after you brush your teeth the night before (at bedtime) and the morning of your surgery.  Follow all directions on your prescription label.    Use the cap on the container to measure 15ml (fill cap to fill line)  Swish (gargle if you can) the mouthwash in your mouth for at least 30 seconds, (do not to swallow) spit out  After you use your CHG rinse, do not rinse your mouth with water, drink or eat.   Please refer to prescription label for the appropriate time to resume oral intake  Dental rinse comes in one size bottle: 473ml ~16oz.  You will have leftover    rinse, discard after this use.    What side effects might I have using the CHG oral/dental rinse?  CHG rinse will stick to plaque on the teeth.  Brush and floss just before use.  Teeth brushing will help avoid staining of plaque during use.    Who should I contact if I have questions about the CHG oral/dental rinse?  Please call [facility], Preadmission Testing at [phone] if you have any questions

## 2025-04-17 NOTE — CPM/PAT H&P
[facility]/PAT Evaluation       Name: Shirley Arizmendi (Shirley Arizmendi)  /Age: 1990/34 y.o.     In-Person         Date of Consult: 25    Referring Provider:  Dr. Regis Polanco    Date, Surgery, and Length:  25, Left Ankle Fracture Open Reduction Internal Fixation,  Left Ankle Debridement, 210 minutes      Patient presents to [facility] for perioperative risk assessment prior to scheduled surgery. This is a 34 y.o. female with injury to her left ankle 10/2024, was pregnant at the time of injury. Left ankle x-ray 2025 revealed: subacute posterior malleolar fracture with unchanged alignment. Per Dr. Polanco, pt will be undergoing left ankle scope.      This note was created in part upon personal review of patient's medical records.    Pt has never had general anesthesia or MAC sedation. Has had epidurals with 7 past deliveries, no issues.    No family h/o malignant hyperthermia or other family h/o anesthetic complications.    No history of blood transfusions.    The patient IS NOT a Mosque and will accept blood and blood products if medically indicated.     Type and screen WAS NOT sent.    Past Medical History:   Diagnosis Date    Ankle syndesmosis disruption, left, initial encounter     Anxiety     Closed fracture of posterior malleolus of left tibia, initial encounter     Plan: Left Ankle Fracture Open Reduction Internal Fixation; Left Ankle Debridement 25    Depression     GERD (gastroesophageal reflux disease)     Migraines     Obesity        Past Surgical History:   Procedure Laterality Date    NO PAST SURGERIES         Family History   Problem Relation Name Age of Onset    Hypertension Mother Arnesta     COPD Father Anupam     Diabetes Father Anupam     Hypertension Father Anupam        Social History     Tobacco Use   Smoking Status Never    Passive exposure: Never   Smokeless Tobacco Never       Social History     Substance and Sexual Activity   Alcohol Use Not Currently     Social  "History     Substance and Sexual Activity   Drug Use Never       Allergies   Allergen Reactions    Latex, Natural Rubber Hives    Sulfa (Sulfonamide Antibiotics) Hives     *Pt states she is not currently taking any of the below medications*  Current Outpatient Medications   Medication Instructions    acetaminophen (TYLENOL) 975 mg, oral, Every 6 hours PRN    cholecalciferol (Vitamin D-3) 50 mcg (2,000 units) capsule 1 capsule, Daily    docusate sodium (COLACE) 100 mg, 2 times daily    famotidine (PEPCID) 40 mg, oral, Daily    ferrous sulfate 325 mg, 2 times daily    fluticasone (Flonase) 50 mcg/actuation nasal spray 1 spray, Daily RT    folic acid (FOLVITE) 1 mg, oral, Daily    gabapentin (NEURONTIN) 400 mg, oral, Daily    ibuprofen 600 mg, oral, Every 6 hours PRN    levomilnacipran (FETZIMA) 120 mg, oral, Daily    metoclopramide (REGLAN) 10 mg, As needed    nitrofurantoin, macrocrystal-monohydrate, (Macrobid) 100 mg capsule 100 mg, oral, Daily    omeprazole (PriLOSEC) 40 mg DR capsule 1 capsule, Daily    ondansetron ODT (ZOFRAN-ODT) 4 mg, Every 8 hours PRN    rizatriptan (MAXALT) 10 mg, oral, Once as needed, May repeat in 2 hours if unresolved. Do not exceed 30 mg in 24 hours.    sodium chloride (Ocean) 0.65 % nasal spray 1 spray, Each Nostril, As needed       PAT ROS:   Constitutional:   neg    Neuro/Psych:   neg    Eyes:   neg     use of corrective lenses (wears contact lenses)  Ears:   neg    Nose:   neg    Mouth:   neg    Throat:   neg    Neck:   neg    Cardio:   neg    Respiratory:   neg    Endocrine:   neg    GI:   neg    :   neg    Musculoskeletal:    Arthralgias: left ankle pain.  Hematologic:   neg    Skin:  neg        Physical Exam  Vitals reviewed. Physical exam within normal limits.          PAT AIRWAY:   Airway:     Mallampati::  III    Neck ROM::  Full  normal          Visit Vitals  /77   Pulse 79   Temp 36.7 °C (98 °F) (Temporal)   Resp 18   Ht 1.511 m (4' 11.5\")   Wt 67 kg (147 lb 11.3 oz) "   LMP 07/04/2023   SpO2 98%   BMI 29.33 kg/m²   OB Status Recent pregnancy   Smoking Status Never   BSA 1.68 m²       Patient is a 34 y.o. female scheduled for left ankle surgery with Dr. Polanco on 4/28/25.    Patient is at acceptable risk to proceed with planned surgical procedure. Further cardiac risk stratification deferred at this time.This patient is LOW risk candidate undergoing MODERATE risk procedure, patient is medically optimized for surgery.    Plan    Cardiovascular:    RCRI: 0 Risk of Mace: 0.4%    Caprini: 4 VTE risk: 0.7%    Patient denies any chest pain, tightness, heaviness, pressure, radiating pain, palpitations, irregular heartbeats, lightheadedness, cough, congestion, shortness of breath, JACKSON, PND, near syncope, weight loss or gain.    Good functional capacity    Pulmonary:  No pulmonary diagnosis or significant findings on chart review or clinical presentation.  No further preoperative testing is indicated at this time.  Stop Bang score is 0 placing patient at low risk for JOSE ARMANDO  ARISCAT: <26 points, 1.6% risk of in-hospital postoperative pulmonary complication  PRODIGY: Low risk for opioid induced respiratory depression  Pumonary toilet education discussed, patient also provided deep breathing exercises and incentive spirometry educational handout    GYN: Pt recently delivered 2/2025, currently breastfeeding.    Patient instructed to ambulate as soon as possible postoperatively to decrease thromboembolic risk.    Initiate mechanical DVT prophylaxis as soon as possible and initiate chemical prophylaxis when deemed safe from a bleeding standpoint post surgery.      Risk assessment complete.  Patient is scheduled for a INTERMEDIATE surgical risk procedure. She IS considered medically optimized for the planned procedure.      Labs/testing obtained in PAT on 4/17/25:  MRSA  Lab Results   Component Value Date    WBC 5.6 04/17/2025    HGB 13.8 04/17/2025    HCT 41.4 04/17/2025    MCV 82 04/17/2025      04/17/2025     Results from last 7 days   Lab Units 04/17/25  1538   SODIUM mmol/L 138   POTASSIUM mmol/L 4.4   CHLORIDE mmol/L 105   CO2 mmol/L 23   BUN mg/dL 8   CREATININE mg/dL 0.66   CALCIUM mg/dL 9.0   PROTEIN TOTAL g/dL 6.8   BILIRUBIN TOTAL mg/dL 0.7   ALK PHOS U/L 110   ALT U/L 29   AST U/L 25   GLUCOSE mg/dL 88         Follow up/communication: None      Preoperative medication instructions were provided and reviewed with the patient.  Any additional testing or evaluation was explained to the patient.  Nothing by mouth instructions were discussed and patient's questions were answered prior to conclusion to this encounter.  Patient verbalized understanding of preoperative instructions given in preadmission testing; discharge instructions available in EMR.    This note was dictated with speech recognition.  Minor errors may have been detected during use of speech recognition.

## 2025-04-17 NOTE — CPM/PAT NURSE NOTE
CPM/PAT Nurse Note      Name: Shirley Arizmendi)  /Age: [birth year]//34 y.o.       Medical History[1]    Surgical History[2]    Patient Sexual activity questions deferred to the physician.    Family History[3]    Allergies[4]    Prior to Admission medications    Medication Sig Start Date End Date Taking? Authorizing Provider   acetaminophen (Tylenol) 325 mg tablet Take 3 tablets (975 mg) by mouth every 6 hours if needed for mild pain (1 - 3).  Patient not taking: Reported on 2025   Gilda Johns MD   chlorhexidine (Peridex) 0.12 % solution Use 15 mL in the mouth or throat if needed for wound care (swish and spit 15 mL for 30 seconds night prior to surgery and morning of surgery) for up to 2 days. 25  RENA Webber-CNP   cholecalciferol (Vitamin D-3) 50 mcg (2,000 units) capsule Take 1 capsule (50 mcg) by mouth once daily.  Patient not taking: Reported on 2025 3/12/21   Historical Provider, MD   docusate sodium (Colace) 100 mg capsule Take 1 capsule (100 mg) by mouth 2 times a day.  Patient not taking: Reported on 2025 3/3/24   Historical Provider, MD   famotidine (Pepcid) 40 mg tablet Take 1 tablet (40 mg) by mouth once daily.  Patient not taking: Reported on 2025    Historical Provider, MD   ferrous sulfate 325 mg (65 mg elemental) tablet Take 1 tablet (325 mg) by mouth 2 times a day.  Patient not taking: Reported on 24   Historical Provider, MD   fluticasone (Flonase) 50 mcg/actuation nasal spray Administer 1 spray into affected nostril(s) once daily.  Patient not taking: Reported on 2025   Historical Provider, MD   folic acid (Folvite) 1 mg tablet Take 1 tablet (1 mg) by mouth once daily.  Patient not taking: Reported on 2025    Historical Provider, MD   gabapentin (Neurontin) 400 mg capsule Take 1 capsule (400 mg) by mouth once daily.  Patient not taking: Reported on 2025    Historical Provider, MD   ibuprofen  600 mg tablet Take 1 tablet (600 mg) by mouth every 6 hours if needed for mild pain (1 - 3).  Patient not taking: Reported on 4/17/2025 2/4/25   Gilda Johns MD   levomilnacipran (Fetzima) 120 mg extended release capsule Take 1 capsule (120 mg) by mouth once daily.  Patient not taking: Reported on 4/17/2025    Historical Provider, MD   metoclopramide (Reglan) 10 mg tablet Take 1 tablet (10 mg) by mouth if needed (nausea/migraine). every 8 hours as needed  Patient not taking: Reported on 4/17/2025 8/21/24   Historical Provider, MD   nitrofurantoin, macrocrystal-monohydrate, (Macrobid) 100 mg capsule Take 1 capsule (100 mg) by mouth once daily.    Historical Provider, MD   omeprazole (PriLOSEC) 40 mg DR capsule Take 1 capsule (40 mg) by mouth once daily.  Patient not taking: Reported on 4/17/2025 10/29/21   Historical Provider, MD   ondansetron ODT (Zofran-ODT) 4 mg disintegrating tablet Dissolve 1 tablet (4 mg) in the mouth every 8 hours if needed for vomiting or nausea.  Patient not taking: Reported on 4/17/2025 11/18/21   Historical Provider, MD   rizatriptan (Maxalt) 10 mg tablet Take 1 tablet (10 mg) by mouth 1 time if needed for migraine. May repeat in 2 hours if unresolved. Do not exceed 30 mg in 24 hours.  Patient not taking: Reported on 4/17/2025    Historical Provider, MD   sodium chloride (Ocean) 0.65 % nasal spray Administer 1 spray into each nostril if needed for congestion.  Patient not taking: Reported on 4/17/2025    Historical Provider, MD KATY FLORES     DASI Risk Score      Flowsheet Row Questionnaire Series Submission from 2/21/2025 in [facility] with Generic Provider Iam   Can you take care of yourself (eat, dress, bathe, or use toilet)?  2.75  filed at 02/21/2025 2238   Can you walk indoors, such as around your house? 1.75  filed at 02/21/2025 2238   Can you walk a block or two on level ground?  2.75  filed at 02/21/2025 2238   Can you climb a flight of stairs or walk up a hill? 5.5   filed at 02/21/2025 2238   Can you run a short distance? 8  filed at 02/21/2025 2238   Can you do light work around the house like dusting or washing dishes? 2.7  filed at 02/21/2025 2238   Can you do moderate work around the house like vacuuming, sweeping floors or carrying groceries? 3.5  filed at 02/21/2025 2238   Can you do heavy work around the house like scrubbing floors or lifting and moving heavy furniture?  8  filed at 02/21/2025 2238   Can you do yard work like raking leaves, weeding or pushing a mower? 4.5  filed at 02/21/2025 2238   Can you have sexual relations? 5.25  filed at 02/21/2025 2238   Can you participate in moderate recreational activities like golf, bowling, dancing, doubles tennis or throwing a baseball or football? 6  filed at 02/21/2025 2238   Can you participate in strenous sports like swimming, singles tennis, football, basketball, or skiing? 7.5  filed at 02/21/2025 2238   DASI SCORE 58.2  filed at 02/21/2025 2238   METS Score (Will be calculated only when all the questions are answered) 9.9  filed at 02/21/2025 2238          Caprini DVT Assessment      Flowsheet Row Admission (Discharged) from 2/2/2025 in [facility] 3 Obstetrics and Gynecology with Gilda Johns MD Prep for Procedure from 4/1/2024 in [facility] OBSTETRICS GYNECOLOGY VIRTUAL with Michaela Neal, DO   DVT Score (IF A SCORE IS NOT CALCULATING, MUST SELECT A BMI TO COMPLETE) 5 filed at 02/03/2025 0659 5 filed at 04/01/2024 2011   Women Pregnancy or less than 1 month postpartum filed at 02/03/2025 0659 Pregnancy or less than 1 month postpartum filed at 04/01/2024 2011   BMI (BMI MUST BE CHOSEN) 31-40 (Obesity) filed at 02/03/2025 0659 31-40 (Obesity) filed at 04/01/2024 2011   RETIRED: Age -- Less than 40 years filed at 04/01/2024 2011          Modified Frailty Index    No data to display       [id number] Stroke Risk Points  Current as of 42 minutes ago        N/A 0 to 9 Points:      Last  Change: N/A          The [id number] risk score (Lip GH, et al. 2009. © 2010 American College of Chest Physicians) quantifies the risk of stroke for a patient with atrial fibrillation. For patients without atrial fibrillation or under the age of 18 this score appears as N/A. Higher score values generally indicate higher risk of stroke.        This score is not applicable to this patient. Components are not calculated.          Revised Cardiac Risk Index    No data to display       Apfel Simplified Score    No data to display       Risk Analysis Index Results This Encounter    No data found in the last 10 encounters.       Stop Bang Score      Flowsheet Row Questionnaire Series Submission from 2/21/2025 in [facility] with Generic Provider Iam   Do you snore loudly? 0  filed at 02/21/2025 2238   Do you often feel tired or fatigued after your sleep? 0  filed at 02/21/2025 2238   Has anyone ever observed you stop breathing in your sleep? 0  filed at 02/21/2025 2238   Do you have or are you being treated for high blood pressure? 0  filed at 02/21/2025 2238   Recent BMI (Calculated) 32.3  filed at 02/21/2025 2238   Is BMI greater than 35 kg/m2? 0=No  filed at 02/21/2025 2238   Age older than 50 years old? 0=No  filed at 02/21/2025 2238   Gender - Male 0=No  filed at 02/21/2025 2238          Prodigy: High Risk  Total Score: 0          ARISCAT Score for Postoperative Pulmonary Complications    No data to display       Cooper Perioperative Risk for Myocardial Infarction or Cardiac Arrest (DOMINGO)    No data to display         Nurse Plan of Action: After Visit Summary (AVS) reviewed and patient verbalized good understanding of medications and NPO instructions.  Pre-op infection prevention measures:  CHG showers and mouthwash reviewed, understanding voiced.  CHG soap given and patient verbalized need to pick CHG mouthwash at their preferred local pharmacy.                  [1]   Past Medical History:  Diagnosis Date     Ankle syndesmosis disruption, left, initial encounter     Anxiety     Closed fracture of posterior malleolus of left tibia, initial encounter     Plan: Left Ankle Fracture Open Reduction Internal Fixation; Left Ankle Debridement 4/28/25    Depression     GERD (gastroesophageal reflux disease)     Migraines     Obesity    [2]   Past Surgical History:  Procedure Laterality Date    NO PAST SURGERIES     [3]   Family History  Problem Relation Name Age of Onset    Hypertension Mother Arnesta     COPD Father Anupam     Diabetes Father Anupam     Hypertension Father Anupam    [4]   Allergies  Allergen Reactions    Latex, Natural Rubber Hives    Sulfa (Sulfonamide Antibiotics) Hives

## 2025-04-17 NOTE — CPM/PAT NURSE NOTE
CPM/PAT Nurse Note      Name: Shirley Arizmendi)  /Age: [birth year]//34 y.o.       Medical History[1]    Surgical History[2]    Patient Sexual activity questions deferred to the physician.    Family History[3]    Allergies[4]    Prior to Admission medications    Medication Sig Start Date End Date Taking? Authorizing Provider   acetaminophen (Tylenol) 325 mg tablet Take 3 tablets (975 mg) by mouth every 6 hours if needed for mild pain (1 - 3).  Patient not taking: Reported on 2025   iGlda Johns MD   chlorhexidine (Peridex) 0.12 % solution Use 15 mL in the mouth or throat if needed for wound care (swish and spit 15 mL for 30 seconds night prior to surgery and morning of surgery) for up to 2 days. 25  RENA Webber-CNP   cholecalciferol (Vitamin D-3) 50 mcg (2,000 units) capsule Take 1 capsule (50 mcg) by mouth once daily.  Patient not taking: Reported on 2025 3/12/21   Historical Provider, MD   docusate sodium (Colace) 100 mg capsule Take 1 capsule (100 mg) by mouth 2 times a day.  Patient not taking: Reported on 2025 3/3/24   Historical Provider, MD   famotidine (Pepcid) 40 mg tablet Take 1 tablet (40 mg) by mouth once daily.  Patient not taking: Reported on 2025    Historical Provider, MD   ferrous sulfate 325 mg (65 mg elemental) tablet Take 1 tablet (325 mg) by mouth 2 times a day.  Patient not taking: Reported on 24   Historical Provider, MD   fluticasone (Flonase) 50 mcg/actuation nasal spray Administer 1 spray into affected nostril(s) once daily.  Patient not taking: Reported on 2025   Historical Provider, MD   folic acid (Folvite) 1 mg tablet Take 1 tablet (1 mg) by mouth once daily.  Patient not taking: Reported on 2025    Historical Provider, MD   gabapentin (Neurontin) 400 mg capsule Take 1 capsule (400 mg) by mouth once daily.  Patient not taking: Reported on 2025    Historical Provider, MD   ibuprofen  600 mg tablet Take 1 tablet (600 mg) by mouth every 6 hours if needed for mild pain (1 - 3).  Patient not taking: Reported on 4/17/2025 2/4/25   Gilda Johns MD   levomilnacipran (Fetzima) 120 mg extended release capsule Take 1 capsule (120 mg) by mouth once daily.  Patient not taking: Reported on 4/17/2025    Historical Provider, MD   metoclopramide (Reglan) 10 mg tablet Take 1 tablet (10 mg) by mouth if needed (nausea/migraine). every 8 hours as needed  Patient not taking: Reported on 4/17/2025 8/21/24   Historical Provider, MD   nitrofurantoin, macrocrystal-monohydrate, (Macrobid) 100 mg capsule Take 1 capsule (100 mg) by mouth once daily.    Historical Provider, MD   omeprazole (PriLOSEC) 40 mg DR capsule Take 1 capsule (40 mg) by mouth once daily.  Patient not taking: Reported on 4/17/2025 10/29/21   Historical Provider, MD   ondansetron ODT (Zofran-ODT) 4 mg disintegrating tablet Dissolve 1 tablet (4 mg) in the mouth every 8 hours if needed for vomiting or nausea.  Patient not taking: Reported on 4/17/2025 11/18/21   Historical Provider, MD   rizatriptan (Maxalt) 10 mg tablet Take 1 tablet (10 mg) by mouth 1 time if needed for migraine. May repeat in 2 hours if unresolved. Do not exceed 30 mg in 24 hours.  Patient not taking: Reported on 4/17/2025    Historical Provider, MD   sodium chloride (Ocean) 0.65 % nasal spray Administer 1 spray into each nostril if needed for congestion.  Patient not taking: Reported on 4/17/2025    Historical Provider, MD KATY FLORES     DASI Risk Score      Flowsheet Row Questionnaire Series Submission from 2/21/2025 in [facility] with Generic Provider Iam   Can you take care of yourself (eat, dress, bathe, or use toilet)?  2.75  filed at 02/21/2025 2238   Can you walk indoors, such as around your house? 1.75  filed at 02/21/2025 2238   Can you walk a block or two on level ground?  2.75  filed at 02/21/2025 2238   Can you climb a flight of stairs or walk up a hill? 5.5   filed at 02/21/2025 2238   Can you run a short distance? 8  filed at 02/21/2025 2238   Can you do light work around the house like dusting or washing dishes? 2.7  filed at 02/21/2025 2238   Can you do moderate work around the house like vacuuming, sweeping floors or carrying groceries? 3.5  filed at 02/21/2025 2238   Can you do heavy work around the house like scrubbing floors or lifting and moving heavy furniture?  8  filed at 02/21/2025 2238   Can you do yard work like raking leaves, weeding or pushing a mower? 4.5  filed at 02/21/2025 2238   Can you have sexual relations? 5.25  filed at 02/21/2025 2238   Can you participate in moderate recreational activities like golf, bowling, dancing, doubles tennis or throwing a baseball or football? 6  filed at 02/21/2025 2238   Can you participate in strenous sports like swimming, singles tennis, football, basketball, or skiing? 7.5  filed at 02/21/2025 2238   DASI SCORE 58.2  filed at 02/21/2025 2238   METS Score (Will be calculated only when all the questions are answered) 9.9  filed at 02/21/2025 2238          Caprini DVT Assessment      Flowsheet Row Admission (Discharged) from 2/2/2025 in [facility] Obstetrics and Gynecology with Gilda Johns MD Prep for Procedure from 4/1/2024 in [facility] OBSTETRICS GYNECOLOGY VIRTUAL with Michaela Neal, DO   DVT Score (IF A SCORE IS NOT CALCULATING, MUST SELECT A BMI TO COMPLETE) 5 filed at 02/03/2025 0659 5 filed at 04/01/2024 2011   Women Pregnancy or less than 1 month postpartum filed at 02/03/2025 0659 Pregnancy or less than 1 month postpartum filed at 04/01/2024 2011   BMI (BMI MUST BE CHOSEN) 31-40 (Obesity) filed at 02/03/2025 0659 31-40 (Obesity) filed at 04/01/2024 2011   RETIRED: Age -- Less than 40 years filed at 04/01/2024 2011          Modified Frailty Index    No data to display       [id number] Stroke Risk Points  Current as of 42 minutes ago        N/A 0 to 9 Points:      Last  Change: N/A          The AJF7FF9-QHEi risk score (Lip GH, et al. 2009. © 2010 American College of Chest Physicians) quantifies the risk of stroke for a patient with atrial fibrillation. For patients without atrial fibrillation or under the age of 18 this score appears as N/A. Higher score values generally indicate higher risk of stroke.        This score is not applicable to this patient. Components are not calculated.          Revised Cardiac Risk Index    No data to display       Apfel Simplified Score    No data to display       Risk Analysis Index Results This Encounter    No data found in the last 10 encounters.       Stop Bang Score      Flowsheet Row Questionnaire Series Submission from 2/21/2025 in [facility] with Generic Provider Iam   Do you snore loudly? 0  filed at 02/21/2025 2238   Do you often feel tired or fatigued after your sleep? 0  filed at 02/21/2025 2238   Has anyone ever observed you stop breathing in your sleep? 0  filed at 02/21/2025 2238   Do you have or are you being treated for high blood pressure? 0  filed at 02/21/2025 2238   Recent BMI (Calculated) 32.3  filed at 02/21/2025 2238   Is BMI greater than 35 kg/m2? 0=No  filed at 02/21/2025 2238   Age older than 50 years old? 0=No  filed at 02/21/2025 2238   Gender - Male 0=No  filed at 02/21/2025 2238          Prodigy: High Risk  Total Score: 0          ARISCAT Score for Postoperative Pulmonary Complications    No data to display       Cooper Perioperative Risk for Myocardial Infarction or Cardiac Arrest (DOMINGO)    No data to display         Nurse Plan of Action: After Visit Summary (AVS) reviewed and patient verbalized good understanding of medications and NPO instructions.                   [1]   Past Medical History:  Diagnosis Date    Ankle syndesmosis disruption, left, initial encounter     Anxiety     Closed fracture of posterior malleolus of left tibia, initial encounter     Plan: Left Ankle Fracture Open Reduction Internal  Fixation; Left Ankle Debridement 4/28/25    Depression     GERD (gastroesophageal reflux disease)     Migraines     Obesity    [2]   Past Surgical History:  Procedure Laterality Date    NO PAST SURGERIES     [3]   Family History  Problem Relation Name Age of Onset    Hypertension Mother Sana     COPD Father Anupam     Diabetes Father Anupam     Hypertension Father Anupam    [4]   Allergies  Allergen Reactions    Latex, Natural Rubber Hives    Sulfa (Sulfonamide Antibiotics) Hives

## 2025-04-17 NOTE — H&P (VIEW-ONLY)
[facility]/PAT Evaluation       Name: Shirley Arizmendi (Shirley Arizmendi)  /Age: 1990/34 y.o.     In-Person         Date of Consult: 25    Referring Provider:  Dr. Regis Polanco    Date, Surgery, and Length:  25, Left Ankle Fracture Open Reduction Internal Fixation,  Left Ankle Debridement, 210 minutes      Patient presents to [facility] for perioperative risk assessment prior to scheduled surgery. This is a 34 y.o. female with injury to her left ankle 10/2024, was pregnant at the time of injury. Left ankle x-ray 2025 revealed: subacute posterior malleolar fracture with unchanged alignment. Per Dr. Polanco, pt will be undergoing left ankle scope.      This note was created in part upon personal review of patient's medical records.    Pt has never had general anesthesia or MAC sedation. Has had epidurals with 7 past deliveries, no issues.    No family h/o malignant hyperthermia or other family h/o anesthetic complications.    No history of blood transfusions.    The patient IS NOT a Jainism and will accept blood and blood products if medically indicated.     Type and screen WAS NOT sent.    Past Medical History:   Diagnosis Date    Ankle syndesmosis disruption, left, initial encounter     Anxiety     Closed fracture of posterior malleolus of left tibia, initial encounter     Plan: Left Ankle Fracture Open Reduction Internal Fixation; Left Ankle Debridement 25    Depression     GERD (gastroesophageal reflux disease)     Migraines     Obesity        Past Surgical History:   Procedure Laterality Date    NO PAST SURGERIES         Family History   Problem Relation Name Age of Onset    Hypertension Mother Arnesta     COPD Father Anupam     Diabetes Father Anupam     Hypertension Father Anupam        Social History     Tobacco Use   Smoking Status Never    Passive exposure: Never   Smokeless Tobacco Never       Social History     Substance and Sexual Activity   Alcohol Use Not Currently     Social  "History     Substance and Sexual Activity   Drug Use Never       Allergies   Allergen Reactions    Latex, Natural Rubber Hives    Sulfa (Sulfonamide Antibiotics) Hives     *Pt states she is not currently taking any of the below medications*  Current Outpatient Medications   Medication Instructions    acetaminophen (TYLENOL) 975 mg, oral, Every 6 hours PRN    cholecalciferol (Vitamin D-3) 50 mcg (2,000 units) capsule 1 capsule, Daily    docusate sodium (COLACE) 100 mg, 2 times daily    famotidine (PEPCID) 40 mg, oral, Daily    ferrous sulfate 325 mg, 2 times daily    fluticasone (Flonase) 50 mcg/actuation nasal spray 1 spray, Daily RT    folic acid (FOLVITE) 1 mg, oral, Daily    gabapentin (NEURONTIN) 400 mg, oral, Daily    ibuprofen 600 mg, oral, Every 6 hours PRN    levomilnacipran (FETZIMA) 120 mg, oral, Daily    metoclopramide (REGLAN) 10 mg, As needed    nitrofurantoin, macrocrystal-monohydrate, (Macrobid) 100 mg capsule 100 mg, oral, Daily    omeprazole (PriLOSEC) 40 mg DR capsule 1 capsule, Daily    ondansetron ODT (ZOFRAN-ODT) 4 mg, Every 8 hours PRN    rizatriptan (MAXALT) 10 mg, oral, Once as needed, May repeat in 2 hours if unresolved. Do not exceed 30 mg in 24 hours.    sodium chloride (Ocean) 0.65 % nasal spray 1 spray, Each Nostril, As needed       PAT ROS:   Constitutional:   neg    Neuro/Psych:   neg    Eyes:   neg     use of corrective lenses (wears contact lenses)  Ears:   neg    Nose:   neg    Mouth:   neg    Throat:   neg    Neck:   neg    Cardio:   neg    Respiratory:   neg    Endocrine:   neg    GI:   neg    :   neg    Musculoskeletal:    Arthralgias: left ankle pain.  Hematologic:   neg    Skin:  neg        Physical Exam  Vitals reviewed. Physical exam within normal limits.          PAT AIRWAY:   Airway:     Mallampati::  III    Neck ROM::  Full  normal          Visit Vitals  /77   Pulse 79   Temp 36.7 °C (98 °F) (Temporal)   Resp 18   Ht 1.511 m (4' 11.5\")   Wt 67 kg (147 lb 11.3 oz) "   LMP 07/04/2023   SpO2 98%   BMI 29.33 kg/m²   OB Status Recent pregnancy   Smoking Status Never   BSA 1.68 m²       Patient is a 34 y.o. female scheduled for left ankle surgery with Dr. Polanco on 4/28/25.    Patient is at acceptable risk to proceed with planned surgical procedure. Further cardiac risk stratification deferred at this time.This patient is LOW risk candidate undergoing MODERATE risk procedure, patient is medically optimized for surgery.    Plan    Cardiovascular:    RCRI: 0 Risk of Mace: 0.4%    Caprini: 4 VTE risk: 0.7%    Patient denies any chest pain, tightness, heaviness, pressure, radiating pain, palpitations, irregular heartbeats, lightheadedness, cough, congestion, shortness of breath, JACKSON, PND, near syncope, weight loss or gain.    Good functional capacity    Pulmonary:  No pulmonary diagnosis or significant findings on chart review or clinical presentation.  No further preoperative testing is indicated at this time.  Stop Bang score is 0 placing patient at low risk for JOSE ARMANDO  ARISCAT: <26 points, 1.6% risk of in-hospital postoperative pulmonary complication  PRODIGY: Low risk for opioid induced respiratory depression  Pumonary toilet education discussed, patient also provided deep breathing exercises and incentive spirometry educational handout    GYN: Pt recently delivered 2/2025, currently breastfeeding.    Patient instructed to ambulate as soon as possible postoperatively to decrease thromboembolic risk.    Initiate mechanical DVT prophylaxis as soon as possible and initiate chemical prophylaxis when deemed safe from a bleeding standpoint post surgery.      Risk assessment complete.  Patient is scheduled for a INTERMEDIATE surgical risk procedure. She IS considered medically optimized for the planned procedure.      Labs/testing obtained in PAT on 4/17/25:  MRSA  Lab Results   Component Value Date    WBC 5.6 04/17/2025    HGB 13.8 04/17/2025    HCT 41.4 04/17/2025    MCV 82 04/17/2025      04/17/2025     Results from last 7 days   Lab Units 04/17/25  1538   SODIUM mmol/L 138   POTASSIUM mmol/L 4.4   CHLORIDE mmol/L 105   CO2 mmol/L 23   BUN mg/dL 8   CREATININE mg/dL 0.66   CALCIUM mg/dL 9.0   PROTEIN TOTAL g/dL 6.8   BILIRUBIN TOTAL mg/dL 0.7   ALK PHOS U/L 110   ALT U/L 29   AST U/L 25   GLUCOSE mg/dL 88         Follow up/communication: None      Preoperative medication instructions were provided and reviewed with the patient.  Any additional testing or evaluation was explained to the patient.  Nothing by mouth instructions were discussed and patient's questions were answered prior to conclusion to this encounter.  Patient verbalized understanding of preoperative instructions given in preadmission testing; discharge instructions available in EMR.    This note was dictated with speech recognition.  Minor errors may have been detected during use of speech recognition.

## 2025-04-19 LAB — STAPHYLOCOCCUS SPEC CULT: ABNORMAL

## 2025-04-20 DIAGNOSIS — S93.432A ANKLE SYNDESMOSIS DISRUPTION, LEFT, INITIAL ENCOUNTER: Primary | ICD-10-CM

## 2025-04-20 RX ORDER — MUPIROCIN 20 MG/G
OINTMENT TOPICAL
Qty: 22 G | Refills: 0 | Status: SHIPPED | OUTPATIENT
Start: 2025-04-20 | End: 2025-04-30

## 2025-04-27 ENCOUNTER — ANESTHESIA EVENT (OUTPATIENT)
Dept: OPERATING ROOM | Facility: HOSPITAL | Age: 35
End: 2025-04-27
Payer: COMMERCIAL

## 2025-04-28 ENCOUNTER — PHARMACY VISIT (OUTPATIENT)
Dept: PHARMACY | Facility: CLINIC | Age: 35
End: 2025-04-28
Payer: MEDICAID

## 2025-04-28 ENCOUNTER — ANESTHESIA (OUTPATIENT)
Dept: OPERATING ROOM | Facility: HOSPITAL | Age: 35
End: 2025-04-28
Payer: COMMERCIAL

## 2025-04-28 ENCOUNTER — PATIENT MESSAGE (OUTPATIENT)
Dept: ORTHOPEDIC SURGERY | Facility: CLINIC | Age: 35
End: 2025-04-28

## 2025-04-28 ENCOUNTER — APPOINTMENT (OUTPATIENT)
Dept: RADIOLOGY | Facility: HOSPITAL | Age: 35
End: 2025-04-28
Payer: COMMERCIAL

## 2025-04-28 ENCOUNTER — HOSPITAL ENCOUNTER (OUTPATIENT)
Facility: HOSPITAL | Age: 35
Setting detail: OUTPATIENT SURGERY
Discharge: HOME | End: 2025-04-28
Attending: STUDENT IN AN ORGANIZED HEALTH CARE EDUCATION/TRAINING PROGRAM | Admitting: STUDENT IN AN ORGANIZED HEALTH CARE EDUCATION/TRAINING PROGRAM
Payer: COMMERCIAL

## 2025-04-28 VITALS
RESPIRATION RATE: 16 BRPM | TEMPERATURE: 97.9 F | HEIGHT: 59 IN | OXYGEN SATURATION: 95 % | DIASTOLIC BLOOD PRESSURE: 70 MMHG | HEART RATE: 77 BPM | WEIGHT: 152.34 LBS | SYSTOLIC BLOOD PRESSURE: 109 MMHG | BODY MASS INDEX: 30.71 KG/M2

## 2025-04-28 DIAGNOSIS — S82.392A CLOSED FRACTURE OF POSTERIOR MALLEOLUS OF LEFT TIBIA, INITIAL ENCOUNTER: ICD-10-CM

## 2025-04-28 DIAGNOSIS — S93.432A ANKLE SYNDESMOSIS DISRUPTION, LEFT, INITIAL ENCOUNTER: Primary | ICD-10-CM

## 2025-04-28 LAB — PREGNANCY TEST URINE, POC: NEGATIVE

## 2025-04-28 PROCEDURE — 7100000002 HC RECOVERY ROOM TIME - EACH INCREMENTAL 1 MINUTE: Performed by: STUDENT IN AN ORGANIZED HEALTH CARE EDUCATION/TRAINING PROGRAM

## 2025-04-28 PROCEDURE — 2720000007 HC OR 272 NO HCPCS: Performed by: STUDENT IN AN ORGANIZED HEALTH CARE EDUCATION/TRAINING PROGRAM

## 2025-04-28 PROCEDURE — 2500000004 HC RX 250 GENERAL PHARMACY W/ HCPCS (ALT 636 FOR OP/ED): Mod: JZ | Performed by: ANESTHESIOLOGY

## 2025-04-28 PROCEDURE — 29898 ANKLE ARTHROSCOPY/SURGERY: CPT | Performed by: STUDENT IN AN ORGANIZED HEALTH CARE EDUCATION/TRAINING PROGRAM

## 2025-04-28 PROCEDURE — 76000 FLUOROSCOPY <1 HR PHYS/QHP: CPT | Mod: LT

## 2025-04-28 PROCEDURE — 3700000001 HC GENERAL ANESTHESIA TIME - INITIAL BASE CHARGE: Performed by: STUDENT IN AN ORGANIZED HEALTH CARE EDUCATION/TRAINING PROGRAM

## 2025-04-28 PROCEDURE — 27829 TREAT LOWER LEG JOINT: CPT | Performed by: STUDENT IN AN ORGANIZED HEALTH CARE EDUCATION/TRAINING PROGRAM

## 2025-04-28 PROCEDURE — A27829 PR OPEN TX DISTAL TIBIOFIBULAR JOINT DISRUPTION: Performed by: ANESTHESIOLOGY

## 2025-04-28 PROCEDURE — 3700000002 HC GENERAL ANESTHESIA TIME - EACH INCREMENTAL 1 MINUTE: Performed by: STUDENT IN AN ORGANIZED HEALTH CARE EDUCATION/TRAINING PROGRAM

## 2025-04-28 PROCEDURE — 2500000005 HC RX 250 GENERAL PHARMACY W/O HCPCS: Performed by: ANESTHESIOLOGIST ASSISTANT

## 2025-04-28 PROCEDURE — 81025 URINE PREGNANCY TEST: CPT | Performed by: STUDENT IN AN ORGANIZED HEALTH CARE EDUCATION/TRAINING PROGRAM

## 2025-04-28 PROCEDURE — A27829 PR OPEN TX DISTAL TIBIOFIBULAR JOINT DISRUPTION: Performed by: ANESTHESIOLOGIST ASSISTANT

## 2025-04-28 PROCEDURE — 2500000004 HC RX 250 GENERAL PHARMACY W/ HCPCS (ALT 636 FOR OP/ED): Mod: JZ | Performed by: STUDENT IN AN ORGANIZED HEALTH CARE EDUCATION/TRAINING PROGRAM

## 2025-04-28 PROCEDURE — 3600000007 HC OR TIME - EACH INCREMENTAL 1 MINUTE - PROCEDURE LEVEL TWO: Performed by: STUDENT IN AN ORGANIZED HEALTH CARE EDUCATION/TRAINING PROGRAM

## 2025-04-28 PROCEDURE — 2500000004 HC RX 250 GENERAL PHARMACY W/ HCPCS (ALT 636 FOR OP/ED): Mod: JZ | Performed by: ANESTHESIOLOGIST ASSISTANT

## 2025-04-28 PROCEDURE — 7100000009 HC PHASE TWO TIME - INITIAL BASE CHARGE: Performed by: STUDENT IN AN ORGANIZED HEALTH CARE EDUCATION/TRAINING PROGRAM

## 2025-04-28 PROCEDURE — 2780000003 HC OR 278 NO HCPCS: Performed by: STUDENT IN AN ORGANIZED HEALTH CARE EDUCATION/TRAINING PROGRAM

## 2025-04-28 PROCEDURE — C1713 ANCHOR/SCREW BN/BN,TIS/BN: HCPCS | Performed by: STUDENT IN AN ORGANIZED HEALTH CARE EDUCATION/TRAINING PROGRAM

## 2025-04-28 PROCEDURE — C1769 GUIDE WIRE: HCPCS | Performed by: STUDENT IN AN ORGANIZED HEALTH CARE EDUCATION/TRAINING PROGRAM

## 2025-04-28 PROCEDURE — 2500000004 HC RX 250 GENERAL PHARMACY W/ HCPCS (ALT 636 FOR OP/ED): Mod: JZ

## 2025-04-28 PROCEDURE — 7100000010 HC PHASE TWO TIME - EACH INCREMENTAL 1 MINUTE: Performed by: STUDENT IN AN ORGANIZED HEALTH CARE EDUCATION/TRAINING PROGRAM

## 2025-04-28 PROCEDURE — 7100000001 HC RECOVERY ROOM TIME - INITIAL BASE CHARGE: Performed by: STUDENT IN AN ORGANIZED HEALTH CARE EDUCATION/TRAINING PROGRAM

## 2025-04-28 PROCEDURE — 64447 NJX AA&/STRD FEMORAL NRV IMG: CPT

## 2025-04-28 PROCEDURE — 64445 NJX AA&/STRD SCIATIC NRV IMG: CPT

## 2025-04-28 PROCEDURE — 3600000002 HC OR TIME - INITIAL BASE CHARGE - PROCEDURE LEVEL TWO: Performed by: STUDENT IN AN ORGANIZED HEALTH CARE EDUCATION/TRAINING PROGRAM

## 2025-04-28 PROCEDURE — RXMED WILLOW AMBULATORY MEDICATION CHARGE

## 2025-04-28 DEVICE — SCREW, LOW PROFILE, CORTICAL, 3.5 X 12MM. SS: Type: IMPLANTABLE DEVICE | Site: ANKLE | Status: FUNCTIONAL

## 2025-04-28 DEVICE — IMPLANTABLE DEVICE: Type: IMPLANTABLE DEVICE | Site: ANKLE | Status: FUNCTIONAL

## 2025-04-28 DEVICE — K-LESS T-ROPE W/DRV, SYN REPR, SS
Type: IMPLANTABLE DEVICE | Site: ANKLE | Status: FUNCTIONAL
Brand: ARTHREX®

## 2025-04-28 RX ORDER — ROPIVACAINE HCL/PF 100MG/20ML
SYRINGE (ML) INJECTION AS NEEDED
Status: DISCONTINUED | OUTPATIENT
Start: 2025-04-28 | End: 2025-04-28

## 2025-04-28 RX ORDER — HYDROCODONE BITARTRATE AND ACETAMINOPHEN 5; 325 MG/1; MG/1
1 TABLET ORAL EVERY 4 HOURS PRN
Qty: 42 TABLET | Refills: 0 | Status: SHIPPED | OUTPATIENT
Start: 2025-04-28

## 2025-04-28 RX ORDER — HYDRALAZINE HYDROCHLORIDE 20 MG/ML
5 INJECTION INTRAMUSCULAR; INTRAVENOUS EVERY 30 MIN PRN
Status: DISCONTINUED | OUTPATIENT
Start: 2025-04-28 | End: 2025-04-28 | Stop reason: HOSPADM

## 2025-04-28 RX ORDER — SODIUM CHLORIDE, SODIUM LACTATE, POTASSIUM CHLORIDE, AND CALCIUM CHLORIDE .6; .31; .03; .02 G/100ML; G/100ML; G/100ML; G/100ML
IRRIGANT IRRIGATION AS NEEDED
Status: DISCONTINUED | OUTPATIENT
Start: 2025-04-28 | End: 2025-04-28 | Stop reason: HOSPADM

## 2025-04-28 RX ORDER — PROPOFOL 10 MG/ML
INJECTION, EMULSION INTRAVENOUS AS NEEDED
Status: DISCONTINUED | OUTPATIENT
Start: 2025-04-28 | End: 2025-04-28

## 2025-04-28 RX ORDER — ENOXAPARIN SODIUM 100 MG/ML
30 INJECTION SUBCUTANEOUS 2 TIMES DAILY
Qty: 84 EACH | Refills: 0 | Status: SHIPPED | OUTPATIENT
Start: 2025-04-28 | End: 2025-06-09

## 2025-04-28 RX ORDER — FENTANYL CITRATE 50 UG/ML
INJECTION, SOLUTION INTRAMUSCULAR; INTRAVENOUS AS NEEDED
Status: DISCONTINUED | OUTPATIENT
Start: 2025-04-28 | End: 2025-04-28

## 2025-04-28 RX ORDER — ONDANSETRON HYDROCHLORIDE 2 MG/ML
INJECTION, SOLUTION INTRAVENOUS AS NEEDED
Status: DISCONTINUED | OUTPATIENT
Start: 2025-04-28 | End: 2025-04-28

## 2025-04-28 RX ORDER — ALBUTEROL SULFATE 0.83 MG/ML
2.5 SOLUTION RESPIRATORY (INHALATION) ONCE AS NEEDED
Status: DISCONTINUED | OUTPATIENT
Start: 2025-04-28 | End: 2025-04-28 | Stop reason: HOSPADM

## 2025-04-28 RX ORDER — LABETALOL HYDROCHLORIDE 5 MG/ML
5 INJECTION, SOLUTION INTRAVENOUS ONCE AS NEEDED
Status: DISCONTINUED | OUTPATIENT
Start: 2025-04-28 | End: 2025-04-28 | Stop reason: HOSPADM

## 2025-04-28 RX ORDER — ONDANSETRON 4 MG/1
4 TABLET, ORALLY DISINTEGRATING ORAL ONCE
Status: COMPLETED | OUTPATIENT
Start: 2025-04-28 | End: 2025-04-28

## 2025-04-28 RX ORDER — LIDOCAINE HYDROCHLORIDE 20 MG/ML
INJECTION, SOLUTION INFILTRATION; PERINEURAL AS NEEDED
Status: DISCONTINUED | OUTPATIENT
Start: 2025-04-28 | End: 2025-04-28

## 2025-04-28 RX ORDER — LIDOCAINE HYDROCHLORIDE 10 MG/ML
0.1 INJECTION, SOLUTION EPIDURAL; INFILTRATION; INTRACAUDAL; PERINEURAL ONCE
Status: DISCONTINUED | OUTPATIENT
Start: 2025-04-28 | End: 2025-04-28 | Stop reason: HOSPADM

## 2025-04-28 RX ORDER — ONDANSETRON HYDROCHLORIDE 2 MG/ML
4 INJECTION, SOLUTION INTRAVENOUS ONCE AS NEEDED
Status: DISCONTINUED | OUTPATIENT
Start: 2025-04-28 | End: 2025-04-28 | Stop reason: HOSPADM

## 2025-04-28 RX ORDER — MIDAZOLAM HYDROCHLORIDE 1 MG/ML
INJECTION, SOLUTION INTRAMUSCULAR; INTRAVENOUS AS NEEDED
Status: DISCONTINUED | OUTPATIENT
Start: 2025-04-28 | End: 2025-04-28

## 2025-04-28 RX ORDER — OXYCODONE HYDROCHLORIDE 5 MG/1
5 TABLET ORAL EVERY 4 HOURS PRN
Status: DISCONTINUED | OUTPATIENT
Start: 2025-04-28 | End: 2025-04-28 | Stop reason: HOSPADM

## 2025-04-28 RX ORDER — SODIUM CHLORIDE, SODIUM LACTATE, POTASSIUM CHLORIDE, CALCIUM CHLORIDE 600; 310; 30; 20 MG/100ML; MG/100ML; MG/100ML; MG/100ML
INJECTION, SOLUTION INTRAVENOUS CONTINUOUS PRN
Status: DISCONTINUED | OUTPATIENT
Start: 2025-04-28 | End: 2025-04-28

## 2025-04-28 RX ORDER — TRANEXAMIC ACID 100 MG/ML
INJECTION, SOLUTION INTRAVENOUS AS NEEDED
Status: DISCONTINUED | OUTPATIENT
Start: 2025-04-28 | End: 2025-04-28

## 2025-04-28 RX ORDER — SODIUM CHLORIDE, SODIUM LACTATE, POTASSIUM CHLORIDE, CALCIUM CHLORIDE 600; 310; 30; 20 MG/100ML; MG/100ML; MG/100ML; MG/100ML
100 INJECTION, SOLUTION INTRAVENOUS CONTINUOUS
Status: DISCONTINUED | OUTPATIENT
Start: 2025-04-28 | End: 2025-04-28 | Stop reason: HOSPADM

## 2025-04-28 RX ORDER — CEFAZOLIN 1 G/1
INJECTION, POWDER, FOR SOLUTION INTRAVENOUS AS NEEDED
Status: DISCONTINUED | OUTPATIENT
Start: 2025-04-28 | End: 2025-04-28

## 2025-04-28 RX ADMIN — DEXAMETHASONE SODIUM PHOSPHATE 4 MG: 4 INJECTION, SOLUTION INTRA-ARTICULAR; INTRALESIONAL; INTRAMUSCULAR; INTRAVENOUS; SOFT TISSUE at 07:43

## 2025-04-28 RX ADMIN — LIDOCAINE HYDROCHLORIDE 100 MG: 20 INJECTION, SOLUTION INFILTRATION; PERINEURAL at 07:42

## 2025-04-28 RX ADMIN — FENTANYL CITRATE 25 MCG: 50 INJECTION, SOLUTION INTRAMUSCULAR; INTRAVENOUS at 09:37

## 2025-04-28 RX ADMIN — CEFAZOLIN 2 G: 1 INJECTION, POWDER, FOR SOLUTION INTRAMUSCULAR; INTRAVENOUS at 07:43

## 2025-04-28 RX ADMIN — Medication 30 ML: at 07:26

## 2025-04-28 RX ADMIN — FENTANYL CITRATE 50 MCG: 50 INJECTION, SOLUTION INTRAMUSCULAR; INTRAVENOUS at 09:20

## 2025-04-28 RX ADMIN — MIDAZOLAM HYDROCHLORIDE 2 MG: 1 INJECTION, SOLUTION INTRAMUSCULAR; INTRAVENOUS at 07:22

## 2025-04-28 RX ADMIN — HYDROMORPHONE HYDROCHLORIDE 0.5 MG: 1 INJECTION, SOLUTION INTRAMUSCULAR; INTRAVENOUS; SUBCUTANEOUS at 11:19

## 2025-04-28 RX ADMIN — ONDANSETRON 4 MG: 4 TABLET, ORALLY DISINTEGRATING ORAL at 12:45

## 2025-04-28 RX ADMIN — ONDANSETRON 4 MG: 2 INJECTION INTRAMUSCULAR; INTRAVENOUS at 10:07

## 2025-04-28 RX ADMIN — Medication 10 ML: at 07:27

## 2025-04-28 RX ADMIN — MIDAZOLAM HYDROCHLORIDE 2 MG: 1 INJECTION, SOLUTION INTRAMUSCULAR; INTRAVENOUS at 07:23

## 2025-04-28 RX ADMIN — FENTANYL CITRATE 25 MCG: 50 INJECTION, SOLUTION INTRAMUSCULAR; INTRAVENOUS at 09:41

## 2025-04-28 RX ADMIN — FENTANYL CITRATE 50 MCG: 50 INJECTION, SOLUTION INTRAMUSCULAR; INTRAVENOUS at 07:42

## 2025-04-28 RX ADMIN — HYDROMORPHONE HYDROCHLORIDE 0.5 MG: 1 INJECTION, SOLUTION INTRAMUSCULAR; INTRAVENOUS; SUBCUTANEOUS at 11:01

## 2025-04-28 RX ADMIN — SODIUM CHLORIDE, POTASSIUM CHLORIDE, SODIUM LACTATE AND CALCIUM CHLORIDE: 600; 310; 30; 20 INJECTION, SOLUTION INTRAVENOUS at 07:33

## 2025-04-28 RX ADMIN — PROPOFOL 30 MCG/KG/MIN: 10 INJECTION, EMULSION INTRAVENOUS at 08:12

## 2025-04-28 RX ADMIN — PROPOFOL 200 MG: 10 INJECTION, EMULSION INTRAVENOUS at 07:42

## 2025-04-28 RX ADMIN — FENTANYL CITRATE 100 MCG: 50 INJECTION, SOLUTION INTRAMUSCULAR; INTRAVENOUS at 07:22

## 2025-04-28 RX ADMIN — TRANEXAMIC ACID 1000 MG: 1 INJECTION, SOLUTION INTRAVENOUS at 07:45

## 2025-04-28 ASSESSMENT — PAIN DESCRIPTION - LOCATION
LOCATION: ANKLE
LOCATION: ANKLE

## 2025-04-28 ASSESSMENT — PAIN SCALES - GENERAL
PAINLEVEL_OUTOF10: 0 - NO PAIN
PAINLEVEL_OUTOF10: 7
PAINLEVEL_OUTOF10: 7
PAINLEVEL_OUTOF10: 0 - NO PAIN
PAINLEVEL_OUTOF10: 2
PAINLEVEL_OUTOF10: 7
PAINLEVEL_OUTOF10: 7

## 2025-04-28 ASSESSMENT — PAIN - FUNCTIONAL ASSESSMENT
PAIN_FUNCTIONAL_ASSESSMENT: 0-10
PAIN_FUNCTIONAL_ASSESSMENT: UNABLE TO SELF-REPORT
PAIN_FUNCTIONAL_ASSESSMENT: UNABLE TO SELF-REPORT
PAIN_FUNCTIONAL_ASSESSMENT: 0-10

## 2025-04-28 ASSESSMENT — PAIN DESCRIPTION - DESCRIPTORS
DESCRIPTORS: ACHING

## 2025-04-28 ASSESSMENT — PAIN DESCRIPTION - ORIENTATION
ORIENTATION: LEFT
ORIENTATION: LEFT

## 2025-04-28 ASSESSMENT — COLUMBIA-SUICIDE SEVERITY RATING SCALE - C-SSRS
2. HAVE YOU ACTUALLY HAD ANY THOUGHTS OF KILLING YOURSELF?: NO
6. HAVE YOU EVER DONE ANYTHING, STARTED TO DO ANYTHING, OR PREPARED TO DO ANYTHING TO END YOUR LIFE?: NO
1. IN THE PAST MONTH, HAVE YOU WISHED YOU WERE DEAD OR WISHED YOU COULD GO TO SLEEP AND NOT WAKE UP?: NO

## 2025-04-28 NOTE — DISCHARGE INSTRUCTIONS
Follow-Up Instructions  You will need to be seen in clinic by Dr. Polanco in 10-14 days weeks for a post-operative evaluation.      You will need to call and schedule an appointment, unless there is a previous appointment that appears on your discharge instructions.  The direct orthopaedic clinic appointment line phone number is [phone].  Please do not delay in calling to make this appointment.    Activity Restrictions  1) No driving until further instructed by your orthopaedic physician, which will be addressed at your outpatient appointments.    2) No driving or operating heavy machinery while taking narcotic pain medication.    3) Weight bearing status --> non weight bearing left leg.     Discharge Medications  You have been sent home with the following home medications: Norco, Lovenox, Colace, and Zofran. Colace is a stool softener to reduce the narcotic pain medications cause. Take it twice a day while taking narcotic pain medication to ensure you maintain your regular bowel movement frequency. Lovenox is taken twice daily to help reduce your risk of blood clots. Norco is taken for pain control as needed.     Splint/Cast care instructions:   1) Keep your splint on until your follow up visit with your surgeon.    2) Do not get your splint/cast wet for any reason. This includes protecting it from shower water, bath water, and the rain. If the cast/splint becomes wet for any reason, you need to be seen immediately, either in the emergency department or in the first available clinic appointment, in order to have the splint/cast changed. Allowing a wet splint/cast to sit on your skin may cause skin breakdown and infection.    3) Do not stick any sharp objects (knives, forks, clothes hangers, etc) inside your splint/cast to itch. These objects scratch the skin, which may become infected. Alternatively, you may blow a hair dryer, on the cool air setting, in order to provide some relief.    4) You should keep your  operative or injured extremity elevated at or above the level of your heart for the first 48-72 hours. This will help minimize the swelling in the immediate aftermath from surgery or from an acute fracture/injury.    5) You may ice your injured/operative extremity, which is especially useful to minimize swelling, in the first 48-72 hours. Make sure that the ice is not in direct contact with your skin, and that the ice does not leak out of it's bag. It will take ~30 minutes for the ice/cooling to move through your splint/cast material, but it will do so. Double-bagging ice is an effective technique.    6) If you begin to experience progressive and rapidly increasing pain that seems out of proportion to what you normally have been experiencing from your baseline pain after surgery/injury, or if your hand or foot become numb or turn blue and cold - you NEED TO CALL US IMMEDIATELY. Alternatively, you may come into the emergency department IMMEDIATELY for an emergent evaluation.

## 2025-04-28 NOTE — ANESTHESIA PROCEDURE NOTES
Airway  Date/Time: 4/28/2025 7:44 AM  Reason: elective    Airway not difficult    Staffing  Performed: ARGELIA   Authorized by: Shan Hicks MD    Performed by: MANJU Young  Patient location during procedure: OR    Patient Condition  Indications for airway management: anesthesia and airway protection  Sedation level: deep     Final Airway Details   Preoxygenated: yes  Final airway type: supraglottic airway  Successful airway:   Size: 3  Number of attempts at approach: 1           Please review

## 2025-04-28 NOTE — ANESTHESIA PREPROCEDURE EVALUATION
Patient: Shirley Arizmendi    Procedure Information       Anesthesia Start Date/Time: 04/28/25 0733    Procedure: Left Ankle Arthroscopy, stability procedures as indicated, possible syndesmosis or deltoid reconstruction (Left: Ankle)    Location: [facility] OR 11 / [facility] OR    Surgeons: Regis Polanco MD          35 yo F hx controlled GERD, migraine, neg HCG.  Pt is breast feeding currently after delivery of infant 2/2025.  Discussed how currently pediatricians recommend to continue to breast feed the baby like normal postoperatively.  Also mentioned the old pediatrician recommendation to pump/dump.  Ultimately, the patient can decide which way she wants to proceed.      Relevant Problems   Neuro   (+) Anxiety   (+) Depression   (+) History of migraine headaches       Clinical information reviewed:   Tobacco  Allergies  Meds   Med Hx  Surg Hx  OB Status  Fam Hx  Soc   Hx        NPO Detail:  NPO/Void Status  Carbohydrate Drink Given Prior to Surgery? : N  Date of Last Liquid: 04/28/25  Time of Last Liquid: 0330  Date of Last Solid: 04/27/25  Time of Last Solid: 2300  Last Intake Type: Clear fluids  Time of Last Void: 0644         Physical Exam    Airway  Mallampati: II  TM distance: >3 FB  Neck ROM: full  Mouth opening: 3 or more finger widths     Cardiovascular Rate: normal     Dental - normal exam     Pulmonary - normal exam   Abdominal            Anesthesia Plan    History of general anesthesia?: yes  History of complications of general anesthesia?: no    ASA 2     general   (Pt is breast feeding currently after delivery of infant 2/2025.  Discussed how currently pediatricians recommend to continue to breast feed the baby like normal postoperatively.  Also mentioned the old pediatrician recommendation to pump/dump.  Ultimately, the patient can decide which way she wants to proceed.)  intravenous induction   Postoperative pain plan includes opioids.  Anesthetic plan and risks discussed with  patient.

## 2025-04-28 NOTE — ANESTHESIA PROCEDURE NOTES
Peripheral Block    Patient location during procedure: pre-op  Medication administered at: 4/28/2025 7:20 AM  End time: 4/28/2025 7:26 AM  Reason for block: at surgeon's request and post-op pain management  Staffing  Performed: fellow and attending   Authorized by: Shan Hicks MD    Performed by: Hattie Harris MD  Preanesthetic Checklist  Completed: patient identified, IV checked, site marked, risks and benefits discussed, surgical consent, monitors and equipment checked, pre-op evaluation and timeout performed   Timeout performed at: 4/28/2025 7:21 AM  Peripheral Block  Patient position: laying flat  Prep: ChloraPrep  Patient monitoring: heart rate, continuous pulse ox and cardiac monitor  Block type: popliteal and saphenous  Laterality: left  Injection technique: single-shot  Guidance: ultrasound guided  Local infiltration: lidocaine  Infiltration strength: 1 %  Dose: 2 mL  Needle  Needle type: short-bevel   Needle gauge: 22 G  Needle length: 8 cm  Needle localization: ultrasound guidance     image stored in chart  Assessment  Injection assessment: negative aspiration for heme, incremental injection and local visualized surrounding nerve on ultrasound  Paresthesia pain: none  Heart rate change: no  Slow fractionated injection: yes  Additional Notes  Popliteal block and saphenous nerve block: Informed consent obtained.  Risks, benefits, and alternatives discussed.  ASA monitors placed, general anesthesia induced and timeout performed.  Patient positioned, prepped with chlorhexidine, and draped with sterile towels.      Ultrasound guidance was used to visualize the tibial and common peroneal nerves at the popliteal fossa and surrounding structures with visualization of the needle throughout duration of the procedure.  Aspiration was negative. A total of ropi 0.5 % 30 ml was divided and injected on left side    Ultrasound guidance was used to visualize the saphenous nerve at the adductor canal and  surrounding structures with visualization of the needle throughout duration of the procedure. Aspiration negative. A total of ropi 0.25 % 20 ml was divided and injected on left side

## 2025-04-28 NOTE — BRIEF OP NOTE
Date: 2025  OR Location: [facility] OR    Name: Shirley Arizmendi, : 1990, Age: 34 y.o., MRN: [id number], Sex: female    Diagnosis  Pre-op Diagnosis      * Closed fracture of posterior malleolus of left tibia, initial encounter [S82.392A]     * Ankle syndesmosis disruption, left, initial encounter [S93.432A] Post-op Diagnosis     * Closed fracture of posterior malleolus of left tibia, initial encounter [S82.392A]     * Ankle syndesmosis disruption, left, initial encounter [S93.432A]     Procedures  Left Ankle Arthroscopy, ORIF syndesmosis  18693 - MT ARTHROSCOPY ANKLE SURGICAL DEBRIDEMENT EXTENSIVE    Left Ankle Arthroscopy, ORIF syndesmosis  28961 - MT OPEN TX DISTAL TIBIOFIBULAR JOINT DISRUPTION      Surgeons      * Regis Polanco - Primary    Resident/Fellow/Other Assistant:  Surgeons and Role:     * Jose Smith MD - Resident - Assisting    Staff:   Circulator: Blake Galeas Person: Bar Osuna Circulator: Gurjit    Anesthesia Staff: Anesthesiologist: Shan Hicks MD  C-AA: MANJU Young    Procedure Summary  Anesthesia: General  ASA: II  Estimated Blood Loss: 5mL  Intra-op Medications:   Administrations occurring from 0705 to 1035 on 25:   Medication Name Total Dose   lactated Ringer's irrigation solution 4,000 mL   ceFAZolin (Ancef) vial 1 g 2 g   dexAMETHasone (Decadron) 4 mg/mL 4 mg   fentaNYL (Sublimaze) injection 50 mcg/mL 250 mcg   LR infusion Cannot be calculated   lidocaine (Xylocaine) 2 % 100 mg   midazolam (Versed) 1 mg/1 mL 4 mg   ondansetron 2 mg/mL 4 mg   propofol (Diprivan) injection 10 mg/mL 489.18 mg   ropivacaine PF (Naropin) 0.5% - Syringe 40 mL   tranexamic acid (Cyklokapron) injection 1,000 mg              Anesthesia Record               Intraprocedure I/O Totals          Intake    Tranexamic Acid 0.00 mL    The total shown is the total volume documented since Anesthesia Start was filed.    LR infusion 600.00 mL    Total Intake 600 mL          Specimen: No  specimens collected               Findings: syndesmotic instability on stress exam    Complications:  None; patient tolerated the procedure well.     Disposition: PACU - hemodynamically stable.  Condition: stable  Specimens Collected: No specimens collected  Attending Attestation: I was present and scrubbed for the entire procedure.    Regis Polanco  Phone Number: [phone]

## 2025-04-28 NOTE — ANESTHESIA POSTPROCEDURE EVALUATION
Patient: Shirley Arizmendi    Procedure Summary       Date: 04/28/25 Room / Location: [facility] OR 11 / [facility] OR    Anesthesia Start: 0733 Anesthesia Stop: 1027    Procedure: Left Ankle Arthroscopy, ORIF syndesmosis (Left: Ankle) Diagnosis:       Closed fracture of posterior malleolus of left tibia, initial encounter      Ankle syndesmosis disruption, left, initial encounter      (Closed fracture of posterior malleolus of left tibia, initial encounter [S82.392A])      (Ankle syndesmosis disruption, left, initial encounter [S93.432A])    Surgeons: Regis Polanco MD Responsible Provider: Shan Hicks MD    Anesthesia Type: general ASA Status: 2            Anesthesia Type: general    Vitals Value Taken Time   /89 04/28/25 11:45   Temp 36.8 °C (98.2 °F) 04/28/25 11:15   Pulse 74 04/28/25 11:48   Resp 14 04/28/25 11:48   SpO2 93 % 04/28/25 11:48   Vitals shown include unfiled device data.    Anesthesia Post Evaluation    Patient participation: complete - patient participated  Level of consciousness: awake  Pain management: satisfactory to patient  Airway patency: patent  Cardiovascular status: acceptable and hemodynamically stable  Respiratory status: acceptable and nonlabored ventilation  Hydration status: balanced  Postoperative Nausea and Vomiting: none        No notable events documented.

## 2025-04-28 NOTE — OP NOTE
Left Ankle Arthroscopy, ORIF syndesmosis (L) Operative Note     Date: 2025  OR Location: [facility] A OR    Name: Shirley Arizmendi : 1990, Age: 34 y.o., MRN: [id number], Sex: female    Diagnosis  Pre-op Diagnosis      * Closed fracture of posterior malleolus of left tibia, initial encounter [S82.392A]     * Ankle syndesmosis disruption, left, initial encounter [S93.432A] Post-op Diagnosis     * Closed fracture of posterior malleolus of left tibia, initial encounter [S82.392A]     * Ankle syndesmosis disruption, left, initial encounter [S93.432A]     Procedures  Left Ankle Arthroscopy, ORIF syndesmosis  16311 - NM ARTHROSCOPY ANKLE SURGICAL DEBRIDEMENT EXTENSIVE    Left Ankle Arthroscopy, ORIF syndesmosis  47435 - NM OPEN TX DISTAL TIBIOFIBULAR JOINT DISRUPTION      Surgeons      * Regis Polanco - Primary    Resident/Fellow/Other Assistant:  Surgeons and Role:     * Jose Smith MD - Resident - Assisting    Staff:   Circulator: Blake Galeas Person: Bar Osuna Circulator: Gurjit    Anesthesia Staff: Anesthesiologist: Shan Hicks MD  C-AA: MANJU Young    Procedure Summary  Anesthesia: General  ASA: II  Estimated Blood Loss: 10 mL  Intra-op Medications:   Administrations occurring from 0705 to 1035 on 25:   Medication Name Total Dose   lactated Ringer's irrigation solution 4,000 mL   ceFAZolin (Ancef) vial 1 g 2 g   dexAMETHasone (Decadron) 4 mg/mL 4 mg   fentaNYL (Sublimaze) injection 50 mcg/mL 250 mcg   LR infusion Cannot be calculated   lidocaine (Xylocaine) 2 % 100 mg   midazolam (Versed) 1 mg/1 mL 4 mg   ondansetron 2 mg/mL 4 mg   propofol (Diprivan) injection 10 mg/mL 489.18 mg   ropivacaine PF (Naropin) 0.5% - Syringe 40 mL   tranexamic acid (Cyklokapron) injection 1,000 mg              Anesthesia Record               Intraprocedure I/O Totals          Intake    Tranexamic Acid 0.00 mL    The total shown is the total volume documented since Anesthesia Start was filed.    LR  infusion 600.00 mL    Total Intake 600 mL          Specimen: No specimens collected              Drains and/or Catheters: * None in log *    Tourniquet Times:     Total Tourniquet Time Documented:  Thigh (Right) - 120 minutes  Total: Thigh (Right) - 120 minutes      Implants:  Implants       Type Name Action Serial No.      Implant KIT, REPAIR, TIGHTROPE XP, SYNEDESMOSIS - SN/A - [id number] Implanted N/A     Implant KIT, REPAIR, TIGHTROPE XP, SYNEDESMOSIS - SN/A - [id number] Implanted N/A     Screw PLATE, LOCKING, 4H, THIRD TUBULAR, SS - SN/A - [id number] Implanted N/A     Screw SCREW, LOW PROFILE, CORTICAL, 3.5 X 12MM. SS - SN/A - [id number] Implanted N/A     Screw ARTHREX 3.5 KRUELOCK SCREW 12MM Implanted N/A            Patient Name: Shirley Arizmendi  MRN: [id number]  YOB: 1990  Date of Service: 04/28/25  Report Type: Operative    SURGEON:  Regis Polanco MD    ANESTHESIA:  General    COMPLICATIONS: None apparent    DISPOSITION: PACU/RNF    BRIEF CLINICAL HISTORY: 34-year-old female who presented for evaluation in February following an injury sustained in October.  Patient was seen in urgent care at that time where she was recommended for outpatient follow-up.  She persisted with pain.  She was taking anti-inflammatories, she was pregnant at the time of initial evaluation with delivery planned.  On my evaluation of the patient she had pain that localized diffusely about the ankle joint.  She had tenderness to palpation at the anterior joint line as well as the AITFL.  Neurosensory examination was otherwise within normal limits.  Review of radiographs including x-ray and CT demonstrated periosteal reaction of the posterior distal tibia as well as a comminuted posterior lateral malleolar fracture with healing and findings concerning for syndesmotic avulsion.  I had a long discussion with the patient regarding treatment options. I reviewed that this was a complex circumstance given her pain, recent  pregnancy with breast-feeding and persistent ankle pain.  I recommended proceeding with a left ankle arthroscopy with stability procedures as indicated, discussed the posterior malleolar fracture was healed and there would be limited benefit to malunion takedown and repair. I reviewed that I would not be able to make them a new or normal extremity. Risks included but are not limited to infection, wound healing complications, need for further surgery, persistent or worsening pain, postoperative stiffness, bleeding, blood loss requiring a blood transfusion, neurovascular injury, mal- or non-union, progression to PTOA, recurrent deformity, hardware failure, hardware pain, failure of the procedure, complications of anesthesia, stroke, DVT/PE, myocardial infarction and death. Benefits included decreased pain, improve function as well as improved stability. Alternatives included continued conservative management. The patient voiced understanding of the risks, benefits and alternatives and the informed consent was signed, with both myself and the patient present.    OPERATIVE REPORT: On the day of surgery 04/28/2025, the patient was met in the preoperative holding area by members of the orthopedic, anesthesia and nursing teams.  I marked the patient's left lower extremity with indelible ink with the patient as my witness.  The informed consent was again reviewed.  All remaining questions were answered.  In the preoperative holding area, the anesthesia team performed a regional block. The patient had been previously medically optimized for surgery by KATY.    The patient was then brought back to the operating room on [facility].  I led a preoperative timeout, verifying the correct patient, procedure and laterality of procedure to be performed.  We confirmed the appropriate availability of all surgical equipment,  implants, utilization of fluoroscopy and confirmed the administration of pre-surgical IV antibiotic  prophylaxis within 1 hour of incision time, 2 g Ancef and weight-based TXA.  All present were in agreement.    Care was handed over to the anesthesia team who provided GETA.  The patient was then transferred onto the operating room table in the supine position.  All bony prominences were padded and an SCD was placed on the contra-lateral extremity. A nonsterile, well-padded thigh tourniquet was placed.  The patient's left lower extremity was then prepped and draped in usual sterile fashion with sterile prep with chlroprep.    I marked out the relevant skin anatomy including ankle mortise, TA tendon and the projected course of the SPN.  I Identified the anteromedial arthroscopy portal just medial to the palpable TA tendon at the level of the tibiotalar joint.  I then led a preprocedure pause, again verifying the correct patient, procedure and laterality of procedure to be performed.  All present were in agreement.  The left lower extremity was then exsanguinated with an Esmarch and the tourniquet was inflated to 275 mmHg.  I then insufflated the tibiotalar joint with intra-articular saline injection with appropriate distention of the anterolateral joint capsule.  I incised the skin only just medial to the TA tendon and utilizing a nick and spread technique came down to the level of the joint capsule and then through the joint capsule with great care to avoid the saphenous nerve and vein as well as TA tendon.  The arthroscopic cannula and trochar was delivered into the joint through the anteromedial portal and this was exchanged for the arthroscope.    The anterolateral portal was then established under direct arthroscopic visualization.  Incision was made through skin only and then hemostat was used to bluntly discussed to and then through the joint capsule utilizing a nick and spread technique with great care to avoid the superficial peroneal nerve branches.    I then turned my attention to performing a diagnostic  evaluation of the tibiotalar joint and extensive arthroscopic debridement.  There were diffuse adhesions as well as hypertrophic synovium noted medially and laterally at the level of the tibiotalar joint. There was no obvious Columbus's ligament identified.  There was a meniscoid type lesion within the syndesmosis.  There was extensive synovitis posteriorly.  There were Outerbridge grade 1-2 changes of the cartilage noted with fissuring and softening throughout, particularly within the syndesmosis at the level of the fibular articular cartilage.      There is no obvious tibial or talar osteochondral lesion identified.  There was exposed bossing from the posterior malleolar malunion that was contoured to a smooth edge with the use of a arthroscopic shaver.  Synovitis was then addressed with arthroscopic RF wand.  The AITFL was obviously unstable.  Attention was then turned to continued arthroscopic debridement of the anterior lateral and medial talar neck to the level of the articular margin.  In maximum plantarflexion, there was no obvious tibial or talar impingement lesions identified.    Attention was then turned to an arthroscopic stability examination.  The arthroscopic shaver did not readily pass into either the medial or lateral gutter suggestive of stability and readily passed into the syndesmosis consistent with instability.    This concluded the arthroscopic portion of the procedure.  The joint was flushed and the remaining fluid was evacuated.  Arthroscopic instruments were then removed and the arthroscopic portals were closed with 3-0 nylon in a simple interrupted fashion.    I then proceeded with syndesmotic stabilization based on arthroscopic findings and CT.  The arthroscopic equipment was removed and new sterile drapes were applied.  I marked out a standard lateral approach to the distal fibula. Laterally, incision was made through skin and subcutaneous tissue with careful dissection with dissecting  scissors to avoid the SPN or sural nerves, which did not cross into the surgical field. Under biplanar fluoroscopic imaging I selected an appropriately sized 4-hole Arthrex one third semitubular plate, the plate was provisionally fixated to bone with k-wires centered through locking towers. Under biplanar fluoroscopic guidance I then drilled, measured and secured a 12  mm bicortical fully threaded screw in the most proximal plate hole.  Then proceeding to the most distal plate hole, I drilled, measured and secured a 12 mm kreulock screw with excellent fixation.    I then turned my attention to performing a syndesmotic reduction, under biplanar fluoroscopic imaging with thumb pressure a k wire was drilled from the lateral cortex of the fibula to the medial cortex of the tibia parallel to the tibiotalar joint utilizing the glide-path technique.  Syndesmotic reduction was confirmed by biplanar fluoroscopic imaging.  I then drilled for 2 flexible syndesmosis fixation through the second and third holes of the plate. I incised on the medial side of the ankle in line with the drill path.  Dissection was deepened carefully with tenotomy scissors and the saphenous vein and nerve were identified, mobilized and protected throughout the duration of the procedure. Periosteum was elevated between the drill holes. From lateral to medial the flexible syndesmosis fixation was deployed under biplanar fluoroscopic imaging with the medial button secured directly to bone with minimal incision of the periosteum and out of the course of the saphenous vein and nerve.  With the ankle in neutral dorsiflexion I then sequentially compressed the syndesmosis in an alternating fashion.  This completed the syndesmotic stabilization portion of the procedure.    Final fluoroscopic films including  mortise and lateral ankle as well as mortise and lateral stress including valgus stress views were obtained demonstrating maintained alignment of the  syndesmosis without obvious medial clear space widening suggestive of deltoid congruency.    The wounds were copiously irrigated and closed in layers.  2-0 Vicryl was used for the deep layer with 3-0 Monocryl for the deep dermal layer and 3-0 nylon for skin.  The skin was cleansed and dried and dry sterile dressings were placed.  The sterile drapes were removed.  The patient was then placed into a well-padded short leg splint.  By this time, the tourniquet had been released and there was excellent reperfusion of the extremity with palpable 2+ DP/PT pulses.    All surgical counts were noted to be correct. The patient was then awoken from anesthesia without complication and transferred from the operating room table onto the [facility] and then brought back to the PACU in stable condition.    Post-Operative Plan:   The patient will remain nonweightbearing in their left lower extremity.  They will begin enoxaparin for DVT prophylaxis.  I have encouraged early mobilization and extremity elevation as tolerated.  I will plan to see the patient back in approximately 2 to 3 weeks for their first postoperative visit.    Evidence of Infection: No   Complications:  None; patient tolerated the procedure well.    Disposition: PACU - hemodynamically stable.  Condition: stable     Attending Attestation: I was present and scrubbed for the entire procedure.    Regis Polanco  Phone Number: [phone]

## 2025-04-28 NOTE — PATIENT COMMUNICATION
Per review of patient's chart, appears med was sent shortly after she sent this message by her PCP.     Tamika Mueller LPN

## 2025-04-29 PROCEDURE — RXMED WILLOW AMBULATORY MEDICATION CHARGE

## 2025-04-30 ENCOUNTER — PHARMACY VISIT (OUTPATIENT)
Dept: PHARMACY | Facility: CLINIC | Age: 35
End: 2025-04-30
Payer: MEDICAID

## 2025-05-05 ENCOUNTER — OFFICE VISIT (OUTPATIENT)
Dept: ORTHOPEDIC SURGERY | Facility: CLINIC | Age: 35
End: 2025-05-05
Payer: COMMERCIAL

## 2025-05-05 DIAGNOSIS — S93.432A ANKLE SYNDESMOSIS DISRUPTION, LEFT, INITIAL ENCOUNTER: ICD-10-CM

## 2025-05-05 DIAGNOSIS — M25.872 IMPINGEMENT OF LEFT ANKLE JOINT: Primary | ICD-10-CM

## 2025-05-05 PROCEDURE — 99211 OFF/OP EST MAY X REQ PHY/QHP: CPT | Performed by: STUDENT IN AN ORGANIZED HEALTH CARE EDUCATION/TRAINING PROGRAM

## 2025-05-05 PROCEDURE — L4361 PNEUMA/VAC WALK BOOT PRE OTS: HCPCS | Performed by: STUDENT IN AN ORGANIZED HEALTH CARE EDUCATION/TRAINING PROGRAM

## 2025-05-05 ASSESSMENT — PAIN SCALES - GENERAL: PAINLEVEL_OUTOF10: 7

## 2025-05-05 ASSESSMENT — PAIN DESCRIPTION - DESCRIPTORS: DESCRIPTORS: ACHING;THROBBING;TIGHTNESS

## 2025-05-05 ASSESSMENT — PAIN - FUNCTIONAL ASSESSMENT: PAIN_FUNCTIONAL_ASSESSMENT: 0-10

## 2025-05-05 NOTE — PROGRESS NOTES
ORTHOPAEDIC SURGERY OUTPATIENT PROGRESS NOTE    Chief Complaint:  Left ankle pain    History Of Present Illness  Shirley Arizmendi is a 34 y.o. female who presents for evaluation of left ankle pain, self-referred.  Patient initially sustained an injury to her ankle from 10/30/2024 in a relatively plantarflexed position.  She was seen in an urgent care setting where imaging was obtained and she was recommended for outpatient follow-up.  Patient unfortunately persisted with pain.  She has been taking anti-inflammatories including ibuprofen.  She is currently pregnant and has 6 children at home.  She has continued to work with severe pain.  Typical pain is reported as 3 out of 10 at present but can be severe at times.  Patient reports she has a high pain threshold and is having difficulty putting full weight on her ankle due to pain.  She is scheduled for induction and several weeks as well as to begin physical therapy.    02/21/2025: Patient returns for follow-up of her left ankle pain.  She has apparently had an uncomplicated delivery.  She continues with 6 out of 10 pain in her ankle.  She has noticed some improvement in pain since giving birth but has had persistent, 6 out of 10 pain that is certainly worsened with carrying her children.  Pain is made worse with walking and standing.    05/05/2025: Patient returns s/p left ankle arthroscopy with extensive debridement and syndesmotic stabilization from 4/28/2025.  Patient is experiencing 7 out of 10 pain, compliant with nonweightbearing restrictions and elevating extremity.  She has been on hydrocodone for pain relief.  She continues to breast-feed and has on enoxaparin for DVT prophylaxis.     Past Medical History  Past Medical History:   Diagnosis Date    Ankle syndesmosis disruption, left, initial encounter     Anxiety     Closed fracture of posterior malleolus of left tibia, initial encounter     Plan: Left Ankle Fracture Open Reduction Internal Fixation; Left Ankle  Debridement 4/28/25    Depression     GERD (gastroesophageal reflux disease)     Migraines     Obesity        Surgical History  Recent Surgeries in Orthopaedic Surgery            No cases to display             Social History  Social History     Socioeconomic History    Marital status: Single   Tobacco Use    Smoking status: Never     Passive exposure: Never    Smokeless tobacco: Never   Vaping Use    Vaping status: Never Used   Substance and Sexual Activity    Alcohol use: Not Currently    Drug use: Never    Sexual activity: Defer     Social Drivers of Health     Financial Resource Strain: Low Risk  (2/2/2025)    Overall Financial Resource Strain (CARDIA)     Difficulty of Paying Living Expenses: Not hard at all   Food Insecurity: No Food Insecurity (2/2/2025)    Hunger Vital Sign     Worried About Running Out of Food in the Last Year: Never true     Ran Out of Food in the Last Year: Never true   Transportation Needs: No Transportation Needs (2/2/2025)    PRAPARE - Transportation     Lack of Transportation (Medical): No     Lack of Transportation (Non-Medical): No   Physical Activity: Sufficiently Active (2/2/2025)    Exercise Vital Sign     Days of Exercise per Week: 7 days     Minutes of Exercise per Session: 60 min   Stress: No Stress Concern Present (2/2/2025)    [employer] - Occupational Stress Questionnaire     Feeling of Stress : Not at all   Social Connections: Unknown (2/2/2025)    Social Connection and Isolation Panel [NHANES]     Frequency of Communication with Friends and Family: Three times a week     Frequency of Social Gatherings with Friends and Family: Three times a week     Attends Mandaeism Services: Never     Active Member of Clubs or Organizations: No     Attends Club or Organization Meetings: Never     Marital Status: Patient declined   Intimate Partner Violence: Not At Risk (2/2/2025)    Humiliation, Afraid, Rape, and Kick questionnaire     Fear of Current or  Ex-Partner: No     Emotionally Abused: No     Physically Abused: No     Sexually Abused: No       Family History  Family History   Problem Relation Name Age of Onset    Hypertension Mother Arnleobardoa     COPD Father Anupam     Diabetes Father Anupam     Hypertension Father Anupam         Allergies  Allergies   Allergen Reactions    Latex, Natural Rubber Hives    Sulfa (Sulfonamide Antibiotics) Hives       Review of Systems  REVIEW OF SYSTEMS  Constitutional: no unplanned weight loss  Psychiatric: no suicidal ideation  ENT: no vision changes, no sinus problems  Pulmonary: no shortness of breath  Lymphatic: no enlarged lymph nodes  Cardiovascular: no chest pain or shortness of breath  Gastrointestinal: no stomach problems  Genitourinary: no dysuria   Skin: no rashes  Endocrine: no thyroid problems  Neurological: no headache, no numbness  Hematological: no easy bruising  Musculoskeletal: Left ankle pain     Physical Exam  PHYSICAL EXAMINATION  Constitutional Exam: well developed and well nourished  Psychiatric Exam: alert and oriented, appropriate mood and behavior  Eye Exam: EOMI  Pulmonary Exam: breathing non-labored, no apparent distress  Lymphatic exam: no appreciable lymphadenopathy in the lower extremities  Cardiovascular exam: RRR to peripheral palpation, DP pulses 2+, PT 2+, toes are pink with good capillary refill, no pitting edema  Skin exam: no open lesions, rashes, abrasions or ulcerations  Neurological exam: sensation to light touch intact in both lower extremities in peripheral and dermatomal distributions (except for any abnormalities noted in musculoskeletal exam)    Musculoskeletal exam: Left lower extremity examination.  Ankle arthroscopy incisions well-appearing as well as lateral fibular and medial malleolar incisions with skin edges and suture line well-approximated.  There is no obvious ankle effusion.  Patient has supple ankle range of motion without crepitus, I am able to passively range her to  neutral dorsiflexion.  Sensation is grossly intact to light touch in the distal extremity with intact PF/DF/EHL and 2+ DP/PT pulses palpated.     Last Recorded Vitals  Last menstrual period 07/04/2023, currently breastfeeding.    Laboratory Results  No results found for this or any previous visit (from the past 24 hours).     Radiology Results  No new imaging visit.    Assessment/Plan:  34-year-old female s/p left ankle arthroscopy with extensive debridement and syndesmotic stabilization from 4/28/2025.  I would recommend the patient maintain strict nonweightbearing to left lower extremity.  I will transition her to a nonweightbearing walking boot for ease of self-care.  She may begin ankle range of motion in the sagittal plane only.  I will retain her sutures today and she will continue on enoxaparin for DVT prophylaxis.  I will plan to see the patient back in 2 weeks for repeat clinical evaluation.  I would anticipate removing the patient's sutures at that time and initiating weightbearing as well as PT.  Upon return, patient would not require further imaging. Upon return patient would require three-view nonweightbearing left ankle.    Regis Polanco MD, [place]  Department of Orthopaedic Surgery  [facility]    The diagnosis and treatment plan were reviewed with the patient. All questions were answered. The patient verbalized understanding of the treatment plan. There were no barriers to understanding identified.    Note dictated with [employer] software.  Completed without full type editing and sent to avoid delay.

## 2025-05-08 ENCOUNTER — PATIENT MESSAGE (OUTPATIENT)
Dept: ORTHOPEDIC SURGERY | Facility: CLINIC | Age: 35
End: 2025-05-08
Payer: COMMERCIAL

## 2025-05-08 DIAGNOSIS — S93.432A ANKLE SYNDESMOSIS DISRUPTION, LEFT, INITIAL ENCOUNTER: ICD-10-CM

## 2025-05-08 RX ORDER — HYDROCODONE BITARTRATE AND ACETAMINOPHEN 5; 325 MG/1; MG/1
1 TABLET ORAL EVERY 4 HOURS PRN
Qty: 42 TABLET | Refills: 0 | Status: SHIPPED | OUTPATIENT
Start: 2025-05-08 | End: 2025-05-08

## 2025-05-08 RX ORDER — HYDROCODONE BITARTRATE AND ACETAMINOPHEN 5; 325 MG/1; MG/1
1 TABLET ORAL EVERY 4 HOURS PRN
Qty: 42 TABLET | Refills: 0 | Status: SHIPPED | OUTPATIENT
Start: 2025-05-08

## 2025-05-16 ENCOUNTER — APPOINTMENT (OUTPATIENT)
Dept: ORTHOPEDIC SURGERY | Facility: CLINIC | Age: 35
End: 2025-05-16
Payer: COMMERCIAL

## 2025-05-16 ENCOUNTER — HOSPITAL ENCOUNTER (OUTPATIENT)
Dept: RADIOLOGY | Facility: CLINIC | Age: 35
Discharge: HOME | End: 2025-05-16
Payer: COMMERCIAL

## 2025-05-16 DIAGNOSIS — M25.872 IMPINGEMENT OF LEFT ANKLE JOINT: ICD-10-CM

## 2025-05-16 DIAGNOSIS — S93.432A ANKLE SYNDESMOSIS DISRUPTION, LEFT, INITIAL ENCOUNTER: ICD-10-CM

## 2025-05-16 DIAGNOSIS — S82.392A CLOSED FRACTURE OF POSTERIOR MALLEOLUS OF LEFT TIBIA, INITIAL ENCOUNTER: ICD-10-CM

## 2025-05-16 PROCEDURE — 73610 X-RAY EXAM OF ANKLE: CPT | Mod: LT

## 2025-05-16 PROCEDURE — 99211 OFF/OP EST MAY X REQ PHY/QHP: CPT | Performed by: STUDENT IN AN ORGANIZED HEALTH CARE EDUCATION/TRAINING PROGRAM

## 2025-05-16 ASSESSMENT — PAIN DESCRIPTION - DESCRIPTORS: DESCRIPTORS: ACHING

## 2025-05-16 ASSESSMENT — PAIN - FUNCTIONAL ASSESSMENT: PAIN_FUNCTIONAL_ASSESSMENT: 0-10

## 2025-05-16 ASSESSMENT — PAIN SCALES - GENERAL: PAINLEVEL_OUTOF10: 3

## 2025-05-16 NOTE — PROGRESS NOTES
ORTHOPAEDIC SURGERY OUTPATIENT PROGRESS NOTE    Chief Complaint:  Left ankle pain    History Of Present Illness  Shirley Arizmendi is a 34 y.o. female who presents for evaluation of left ankle pain, self-referred.  Patient initially sustained an injury to her ankle from 10/30/2024 in a relatively plantarflexed position.  She was seen in an urgent care setting where imaging was obtained and she was recommended for outpatient follow-up.  Patient unfortunately persisted with pain.  She has been taking anti-inflammatories including ibuprofen.  She is currently pregnant and has 6 children at home.  She has continued to work with severe pain.  Typical pain is reported as 3 out of 10 at present but can be severe at times.  Patient reports she has a high pain threshold and is having difficulty putting full weight on her ankle due to pain.  She is scheduled for induction and several weeks as well as to begin physical therapy.    02/21/2025: Patient returns for follow-up of her left ankle pain.  She has apparently had an uncomplicated delivery.  She continues with 6 out of 10 pain in her ankle.  She has noticed some improvement in pain since giving birth but has had persistent, 6 out of 10 pain that is certainly worsened with carrying her children.  Pain is made worse with walking and standing.    05/05/2025: Patient returns s/p left ankle arthroscopy with extensive debridement and syndesmotic stabilization from 4/28/2025.  Patient is experiencing 7 out of 10 pain, compliant with nonweightbearing restrictions and elevating extremity.  She has been on hydrocodone for pain relief.  She continues to breast-feed and has on enoxaparin for DVT prophylaxis.    05/16/2025: Patient returns s/p left ankle arthroscopy with extensive debridement and syndesmotic stabilization from 4/28/2025.  Patient is currently reporting 3 out of 10 pain that is improving.  She has been putting some weight down.  She is anticipating beginning physical  therapy.     Past Medical History  Past Medical History:   Diagnosis Date    Ankle syndesmosis disruption, left, initial encounter     Anxiety     Closed fracture of posterior malleolus of left tibia, initial encounter     Plan: Left Ankle Fracture Open Reduction Internal Fixation; Left Ankle Debridement 4/28/25    Depression     GERD (gastroesophageal reflux disease)     Migraines     Obesity        Surgical History  Recent Surgeries in Orthopaedic Surgery            No cases to display             Social History  Social History     Socioeconomic History    Marital status: Single   Tobacco Use    Smoking status: Never     Passive exposure: Never    Smokeless tobacco: Never   Vaping Use    Vaping status: Never Used   Substance and Sexual Activity    Alcohol use: Not Currently    Drug use: Never    Sexual activity: Defer     Social Drivers of Health     Financial Resource Strain: Low Risk  (2/2/2025)    Overall Financial Resource Strain (CARDIA)     Difficulty of Paying Living Expenses: Not hard at all   Food Insecurity: No Food Insecurity (2/2/2025)    Hunger Vital Sign     Worried About Running Out of Food in the Last Year: Never true     Ran Out of Food in the Last Year: Never true   Transportation Needs: No Transportation Needs (2/2/2025)    PRAPARE - Transportation     Lack of Transportation (Medical): No     Lack of Transportation (Non-Medical): No   Physical Activity: Sufficiently Active (2/2/2025)    Exercise Vital Sign     Days of Exercise per Week: 7 days     Minutes of Exercise per Session: 60 min   Stress: No Stress Concern Present (2/2/2025)    [place] [employer] - Occupational Stress Questionnaire     Feeling of Stress : Not at all   Social Connections: Unknown (2/2/2025)    Social Connection and Isolation Panel [NHANES]     Frequency of Communication with Friends and Family: Three times a week     Frequency of Social Gatherings with Friends and Family: Three times a week      Attends Orthodoxy Services: Never     Active Member of Clubs or Organizations: No     Attends Club or Organization Meetings: Never     Marital Status: Patient declined   Intimate Partner Violence: Not At Risk (2/2/2025)    Humiliation, Afraid, Rape, and Kick questionnaire     Fear of Current or Ex-Partner: No     Emotionally Abused: No     Physically Abused: No     Sexually Abused: No       Family History  Family History   Problem Relation Name Age of Onset    Hypertension Mother Arnesta     COPD Father Anupam     Diabetes Father Anupam     Hypertension Father Anupam         Allergies  Allergies   Allergen Reactions    Latex, Natural Rubber Hives    Sulfa (Sulfonamide Antibiotics) Hives       Review of Systems  REVIEW OF SYSTEMS  Constitutional: no unplanned weight loss  Psychiatric: no suicidal ideation  ENT: no vision changes, no sinus problems  Pulmonary: no shortness of breath  Lymphatic: no enlarged lymph nodes  Cardiovascular: no chest pain or shortness of breath  Gastrointestinal: no stomach problems  Genitourinary: no dysuria   Skin: no rashes  Endocrine: no thyroid problems  Neurological: no headache, no numbness  Hematological: no easy bruising  Musculoskeletal: Left ankle pain     Physical Exam  PHYSICAL EXAMINATION  Constitutional Exam: well developed and well nourished  Psychiatric Exam: alert and oriented, appropriate mood and behavior  Eye Exam: EOMI  Pulmonary Exam: breathing non-labored, no apparent distress  Lymphatic exam: no appreciable lymphadenopathy in the lower extremities  Cardiovascular exam: RRR to peripheral palpation, DP pulses 2+, PT 2+, toes are pink with good capillary refill, no pitting edema  Skin exam: no open lesions, rashes, abrasions or ulcerations  Neurological exam: sensation to light touch intact in both lower extremities in peripheral and dermatomal distributions (except for any abnormalities noted in musculoskeletal exam)    Musculoskeletal exam: Left lower extremity  examination.  Ankle arthroscopy as well as lateral fibular and medial malleolar incisions well-healed.  Skin edges remain clean, dry and intact without induration, fluctuance, drainage or erythema to suggest underlying infection.  Patient has supple and pain-free ankle range of motion without crepitus. I am able to passively range her to neutral dorsiflexion.  Sensation is grossly intact to light touch in the distal extremity with intact PF/DF/EHL and 2+ DP/PT pulses palpated.     Last Recorded Vitals  Last menstrual period 07/04/2023, currently breastfeeding.    Laboratory Results  No results found for this or any previous visit (from the past 24 hours).     Radiology Results  X-ray imaging 3 view nonweightbearing left ankle reviewed and independently evaluated by me on 5/16/2025 demonstrates maintained alignment following flexible syndesmotic fixation with lateral plate and medial cortical button construct.     Assessment/Plan:  34-year-old female s/p left ankle arthroscopy with extensive debridement and syndesmotic stabilization from 4/28/2025.  I would recommend the patient begin progressive weightbearing in her left lower extremity in the walking boot.  She may begin with transfers with assisted ambulation of a walker or crutches and steadily increase her walking as she feels comfortable and then transition from 2 crutches to 1 and then steadily out of the boot into a lace up style ankle brace.  I will provide the patient with a formal referral to physical therapy and I would plan to see the patient back in 6 weeks for a repeat clinical and radiographic evaluation.  Upon return patient require three-view weightbearing left ankle.    Regis Polanco MD, [place]  Department of Orthopaedic Surgery  [facility]    The diagnosis and treatment plan were reviewed with the patient. All questions were answered. The patient verbalized understanding of the treatment plan. There were no  barriers to understanding identified.    Note dictated with [employer] software.  Completed without full type editing and sent to avoid delay.

## 2025-05-19 DIAGNOSIS — S93.432A ANKLE SYNDESMOSIS DISRUPTION, LEFT, INITIAL ENCOUNTER: ICD-10-CM

## 2025-05-20 RX ORDER — HYDROCODONE BITARTRATE AND ACETAMINOPHEN 5; 325 MG/1; MG/1
1 TABLET ORAL EVERY 4 HOURS PRN
Qty: 42 TABLET | Refills: 0 | Status: SHIPPED | OUTPATIENT
Start: 2025-05-20

## 2025-05-28 PROCEDURE — RXMED WILLOW AMBULATORY MEDICATION CHARGE

## 2025-05-29 ENCOUNTER — PHARMACY VISIT (OUTPATIENT)
Dept: PHARMACY | Facility: CLINIC | Age: 35
End: 2025-05-29
Payer: MEDICAID

## 2025-06-05 DIAGNOSIS — S93.432A ANKLE SYNDESMOSIS DISRUPTION, LEFT, INITIAL ENCOUNTER: ICD-10-CM

## 2025-06-05 RX ORDER — HYDROCODONE BITARTRATE AND ACETAMINOPHEN 5; 325 MG/1; MG/1
1 TABLET ORAL EVERY 4 HOURS PRN
Qty: 42 TABLET | Refills: 0 | Status: SHIPPED | OUTPATIENT
Start: 2025-06-05

## 2025-06-06 ENCOUNTER — EVALUATION (OUTPATIENT)
Dept: PHYSICAL THERAPY | Facility: CLINIC | Age: 35
End: 2025-06-06
Payer: COMMERCIAL

## 2025-06-06 DIAGNOSIS — M25.872 IMPINGEMENT OF LEFT ANKLE JOINT: ICD-10-CM

## 2025-06-06 DIAGNOSIS — S82.392A CLOSED FRACTURE OF POSTERIOR MALLEOLUS OF LEFT TIBIA, INITIAL ENCOUNTER: ICD-10-CM

## 2025-06-06 DIAGNOSIS — S82.392D CLOSED FRACTURE OF POSTERIOR MALLEOLUS, LEFT, WITH ROUTINE HEALING, SUBSEQUENT ENCOUNTER: ICD-10-CM

## 2025-06-06 DIAGNOSIS — S93.432D SPRAIN OF TIBIOFIBULAR LIGAMENT OF LEFT ANKLE, SUBSEQUENT ENCOUNTER: Primary | ICD-10-CM

## 2025-06-06 PROCEDURE — 97110 THERAPEUTIC EXERCISES: CPT | Mod: GP | Performed by: PHYSICAL THERAPIST

## 2025-06-06 PROCEDURE — 97161 PT EVAL LOW COMPLEX 20 MIN: CPT | Mod: GP | Performed by: PHYSICAL THERAPIST

## 2025-06-06 PROCEDURE — 97140 MANUAL THERAPY 1/> REGIONS: CPT | Mod: GP | Performed by: PHYSICAL THERAPIST

## 2025-06-06 ASSESSMENT — COLUMBIA-SUICIDE SEVERITY RATING SCALE - C-SSRS
6. HAVE YOU EVER DONE ANYTHING, STARTED TO DO ANYTHING, OR PREPARED TO DO ANYTHING TO END YOUR LIFE?: NO
1. IN THE PAST MONTH, HAVE YOU WISHED YOU WERE DEAD OR WISHED YOU COULD GO TO SLEEP AND NOT WAKE UP?: NO
2. HAVE YOU ACTUALLY HAD ANY THOUGHTS OF KILLING YOURSELF?: NO

## 2025-06-06 ASSESSMENT — PAIN - FUNCTIONAL ASSESSMENT: PAIN_FUNCTIONAL_ASSESSMENT: 0-10

## 2025-06-06 ASSESSMENT — PATIENT HEALTH QUESTIONNAIRE - PHQ9
2. FEELING DOWN, DEPRESSED OR HOPELESS: NOT AT ALL
SUM OF ALL RESPONSES TO PHQ9 QUESTIONS 1 AND 2: 0
1. LITTLE INTEREST OR PLEASURE IN DOING THINGS: NOT AT ALL

## 2025-06-06 ASSESSMENT — PAIN SCALES - GENERAL: PAINLEVEL_OUTOF10: 5 - MODERATE PAIN

## 2025-06-06 ASSESSMENT — ACTIVITIES OF DAILY LIVING (ADL): ADL_ASSISTANCE: INDEPENDENT

## 2025-06-06 NOTE — PROGRESS NOTES
Physical Therapy  Physical Therapy Orthopedic Evaluation    Patient Name: Shirley Arizmendi  MRN: [id number]  Today's Date: 6/6/2025    Insurance:  Payor: [employer] / Plan: [facility] [employer] / Product Type: *No Product type* /   Number of Treatments Authorized: 1/39 (1 used prior)          Current Problem  Problem List Items Addressed This Visit           ICD-10-CM    Closed fracture of posterior malleolus, left, with routine healing, subsequent encounter S82.392D    Impingement of left ankle joint - Primary M25.872    Relevant Orders    Follow Up In Physical Therapy       General:  General  Reason for Referral: L ankle pain DOS 4/28/2025  Referred By: Dr. Regis Polanco  General Comment: Patient states that she has not been in the boot for about 2 weeks. Notes that since then she has been walking around without assistive devices or the brace. States that she has been having difficulty with stairs and long walking where she has increased pain. Pivoting on her ankle also is bothersome.    Precautions:   Precautions  Precautions Comment: None  Rehab Fall Risk: No fall risk identified      Medical History Form: Reviewed (scanned into chart)    Subjective:   Subjective       Pain:  Pain Assessment: 0-10  0-10 (Numeric) Pain Score: 5 - Moderate pain  Pain Type: Surgical pain  Pain Location: Ankle  Pain Orientation: Left, Inner  Pain Radiating Towards: Up the inside of her leg  Pain Descriptors: Aching, Dull, Burning, Numbness  Pain Frequency: Constant/continuous  Pain Onset: Awakened from sleep  Clinical Progression: Gradually improving  Effect of Pain on Daily Activities: Difficulty with ADLs and work related tasks  Patient's Stated Pain Goal: No pain  Pain Interventions: Medication (See MAR), Home medication, Cold applied  Response to Interventions: Decrease in pain    Relevant Information (PMH & Previous Tests/Imaging): See Chart  Previous Interventions/Treatments: Physical Therapy    Prior Level of Function  (PLOF)  Prior Function Per Pt/Caregiver Report  Level of [place]: Independent with ADLs and functional transfers, Independent with homemaking with ambulation  ADL Assistance: Independent  Homemaking Assistance: Independent  Ambulatory Assistance: Independent  Vocational: Full time employment ([employer])  Leisure: Walking, playing with her kids  Hand Dominance: Right  Prior Function Comments: No prior L ankle injuries  Patient previously independent with all ADLs    Patients Living Environment:   Home Living Comment: Stairs at home, 7 kids at home    Primary Language: English    Red Flags: Do you have any of the following? No  Fever/chills, unexplained weight changes, dizziness/fainting, unexplained change in bowel or bladder functions, unexplained malaise or muscle weakness, night pain/sweats, numbness or tingling    Objective     ANKLE         Ankle Observation  Observation Comment: Antalgic gait pattern with reduced L stance time and extension at terminal stance    Ankle Palpation/Joint Mobility Assessment  Palpation/Joint Mobility Comment: TTP Medial incision>lateral, reduced posterior talar glide, TTP along posterior tibial  Ankle AROM  R ankle dorsiflexion: (10°): 6  L ankle dorsiflexion: (10°): 1  R ankle plantarflexion: (40°): 62  L ankle plantarflexion: (40°): 40  R ankle inversion: (30°): 40  L ankle inversion: (30°): 21  R ankle eversion: (20°): 16  L ankle eversion: (20°): 4  Ankle PROM  L ankle dorsiflexion: (10°): 2  L ankle plantarflexion: (40°): 45  L ankle inversion: (30°): 25  L ankle eversion: (20°): 7  Ankle MMT  R ankle dorsiflexion: (5/5): 5/5  L ankle dorsiflexion: (5/5): 4/5 with pain  R ankle plantarflexion: (5/5): 5/5  L ankle plantarflexion: (5/5): 3/5  R ankle inversion: (5/5): 5/5  L ankle inversion: (5/5): 4-/5 with pain  R ankle eversion: (5/5): 5-/5  L ankle eversion: (5/5): 4-/5 with pain  Special Tests  Ankle Special Tests Comment: NT due to post operative status    Outcome  Measures:    Other Measures  Lower Extremity Funtional Score (LEFS): 41    EDUCATION: home exercise program, plan of care, activity modifications, pain management, and injury pathology  Outpatient Education  Individual(s) Educated: Patient  Education Provided: Anatomy, Body Mechanics, Physiology, POC, Post-Op Precautions  Risk and Benefits Discussed with Patient/Caregiver/Other: yes  Patient/Caregiver Demonstrated Understanding: yes  Plan of Care Discussed and Agreed Upon: yes  Patient Response to Education: Patient/Caregiver Verbalized Understanding of Information, Patient/Caregiver Performed Return Demonstration of Exercises/Activities, Patient/Caregiver Asked Appropriate Questions  Education Comment: Access Code: [email or web address]  URL: [email or web address]  Date: 06/06/2025  Prepared by: Taran Muniz    Exercises  - Standing Ankle Dorsiflexion Stretch on Chair  - 2 x daily - 7 x weekly - 2 sets - 10 reps - 5-10 sec hold  - Seated Ankle Alphabet  - 2 x daily - 7 x weekly - 1 sets - 2 reps  - Isometric Ankle Inversion  - 1 x daily - 7 x weekly - 2 sets - 10 reps - 3-5 sec hold  - Isometric Ankle Eversion at Wall  - 1 x daily - 7 x weekly - 2 sets - 10 reps - 3-5 sec hold  - Long Sitting Ankle Plantar Flexion with Resistance  - 1 x daily - 7 x weekly - 3 sets - 15 reps    Assessment:  PT Assessment Results: Decreased strength, Decreased range of motion, Impaired balance, Decreased mobility, Orthopedic restrictions, Pain  Assessment Comment: Patient is a 34-year-old female presents to physical therapy with signs symptoms consistent with left ankle pain status post ORIF.  Patient presents with limited range of motion, increased pain, impaired sensation as well as impaired gait mechanics.  These are preventing her completing ADLs as well as caretaking for her children without any pain or difficulty.  Therefore she will require skilled physical therapy in order to address stated deficits for full return  to pain-free function.    Clinical Presentation: Stable and/or uncomplicated characteristics  Personal Factors: None    Plan:  Treatment/Interventions: Biofeedback, Blood flow restriction therapy, Cryotherapy, Education/ Instruction, Electrical stimulation, Gait training, Manual therapy, Neuromuscular re-education, Self care/ home management, Taping techniques, Therapeutic activities, Therapeutic exercises  PT Plan: Skilled PT  PT Frequency: 2 times per week  Duration: 12 weeks  Onset Date: 04/28/25  Number of Treatments Authorized: 1/39 (1 used prior)  Rehab Potential: Excellent  Plan of Care Agreement: Patient      Goals: Set and discussed today  Active       PT Problem       PT Goal 1       Start:  06/06/25    Expected End:  08/29/25       STG  1) Patient will be able to complete all normal activities with pain no greater than 1/10 in 5 weeks.  2) Patient will be independent with HEP in 3 visits to allow for continued improvement in daily tasks at home and in the community.  3)  Patient will see an 8 degree improvement in left Dorsiflexion in order to reduce gait abnormalities and allow patient to transfer with minimal compensations in 6 weeks.     LTG  1) Patient will improve LEFS to 60/80 in order to allow for greater completion of functional activities at home and in the community in 10 weeks.  2) Patient will have 5/5 strength in left lateral ankle stabilizers to aid in balance with ambulation on varied surfaces in community in 12 weeks.   3) Patient will be able to perform >30 seconds of left Single Leg Stance on multiple surfaces in order to allow for safe ambulation on all levels within the community in 8 weeks.            Patient Stated Goal 1       Start:  06/06/25    Expected End:  08/29/25       Be able to put all weight on ankle             Plan of care was developed with input and agreement by the patient    Treatments:  Therapeutic Exercise  Therapeutic Exercise Performed: Yes  Therapeutic Exercise  Activity 1: See HEP    Manual Therapy  Manual Therapy Performed: Yes  Manual Therapy Activity 1: Grade 3 posterior talar glides  Manual Therapy Activity 2: Sub talar tilts in supine  Manual Therapy Activity 3: STM: L posterior tibial, peroneals, anterior tibial    Ambulatory Screenings Summary       Screening  Frequency  Date Last Completed   Spiritual and Cultural Beliefs   Screening each visit or episode of care 6/6/2025   Falls Risk Screening every ambulatory visit    Pain Screening annually at primary care visit     Domestic Violence screening annually at primary care visit 6/6/2025   Depression Screening annually in the primary care setting 6/6/2025   Suicide Risk Screening annually in the primary care setting 6/6/2025   Nutrition and Food Insecurity   Screening at least annually at primary care visit  2/2/2025   Key Learner annually in the primary care setting 6/6/2025   Drug Screen  4/28/2025  6:36 AM   Alcohol Screen  4/28/2025  6:36 AM   Advance Directive         Time Calculation  Start Time: 1117  Stop Time: 1200  Time Calculation (min): 43 min  PT Evaluation Time Entry  PT Evaluation (Low) Time Entry: 20, PT Therapeutic Procedures Time Entry  Manual Therapy Time Entry: 15  Therapeutic Exercise Time Entry: 8,

## 2025-06-10 ENCOUNTER — TREATMENT (OUTPATIENT)
Dept: PHYSICAL THERAPY | Facility: CLINIC | Age: 35
End: 2025-06-10
Payer: COMMERCIAL

## 2025-06-10 DIAGNOSIS — M25.872 IMPINGEMENT OF LEFT ANKLE JOINT: ICD-10-CM

## 2025-06-10 DIAGNOSIS — S93.432D SPRAIN OF TIBIOFIBULAR LIGAMENT OF LEFT ANKLE, SUBSEQUENT ENCOUNTER: ICD-10-CM

## 2025-06-10 DIAGNOSIS — S82.392A CLOSED FRACTURE OF POSTERIOR MALLEOLUS OF LEFT TIBIA, INITIAL ENCOUNTER: ICD-10-CM

## 2025-06-10 PROCEDURE — 97110 THERAPEUTIC EXERCISES: CPT | Mod: GP | Performed by: PHYSICAL THERAPIST

## 2025-06-10 PROCEDURE — 97140 MANUAL THERAPY 1/> REGIONS: CPT | Mod: GP | Performed by: PHYSICAL THERAPIST

## 2025-06-10 ASSESSMENT — PAIN SCALES - GENERAL: PAINLEVEL_OUTOF10: 4

## 2025-06-10 ASSESSMENT — PAIN - FUNCTIONAL ASSESSMENT: PAIN_FUNCTIONAL_ASSESSMENT: 0-10

## 2025-06-10 NOTE — PROGRESS NOTES
Physical Therapy Treatment    Patient Name: Shirley Arizmendi  MRN: [id number]  Today's Date: 6/10/2025  Date of Surgery: 4/28/2025  Days Since Surgery: 43    Current Problem  Problem List Items Addressed This Visit           ICD-10-CM    Impingement of left ankle joint M25.872     Other Visit Diagnoses         Codes      Sprain of tibiofibular ligament of left ankle, subsequent encounter     S93.432D      Closed fracture of posterior malleolus of left tibia, initial encounter     S82.392A            Insurance:  Payor: [employer] / Plan: [facility] [employer] / Product Type: *No Product type* /   Number of Treatments Authorized: 2/39 (1 used prior)          Subjective   General  Reason for Referral: L ankle pain DOS 4/28/2025  Referred By: Dr. Regis Polanco  General Comment: Patient states that she slipped down a stair and caught her foot. Notes that her foot was pointed down.    Performing HEP?: Yes    Precautions  Precautions  Precautions Comment: None  Pain  Pain Assessment: 0-10  0-10 (Numeric) Pain Score: 4  Pain Type: Surgical pain  Pain Location: Ankle  Pain Orientation: Left, Inner    Objective     General Observation  General Observation: TTP posterior tibial    Treatments:    Therapeutic Exercise  Therapeutic Exercise Performed: Yes  Therapeutic Exercise Activity 1: Sportsarc lvl 1 x 6 min  Therapeutic Exercise Activity 2: Gastroc stretch low level x 1 min  Therapeutic Exercise Activity 3: 1a Heel raise partial range 3 x 30  Therapeutic Exercise Activity 4: 1b March on L with green loop for DF iso 2 x 15  Therapeutic Exercise Activity 5: Total gym lvl 7 DL squatting 3 x 12    Balance/Neuromuscular Re-Education  Balance/Neuromuscular Re-Education Activity Performed: Yes  Balance/Neuromuscular Re-Education Activity 1: Rocker lateral taps 2 x 15    Manual Therapy  Manual Therapy Activity 1: Grade 3 posterior talar glides  Manual Therapy Activity 2: IASTM to L gastroc and posterior tibial in prone  Manual  Therapy Activity 3: STM: L gastroc, posterior tibial, anterior tibial    Assessment:  PT Assessment  PT Assessment Results: Decreased strength, Decreased range of motion, Impaired balance, Decreased mobility, Orthopedic restrictions, Pain  Assessment Comment: Patient continues to have elevated pain with standing exercise.  Educated patient on reducing amplitude of movement to allow for reduced pain elevation.  Focused manual therapy on posterior chain with reduced sensitivity and posterior tibial though patient was sore after and educated on posttreatment soreness.    Plan:  OP PT Plan  Treatment/Interventions: Biofeedback, Blood flow restriction therapy, Cryotherapy, Education/ Instruction, Electrical stimulation, Gait training, Manual therapy, Neuromuscular re-education, Self care/ home management, Taping techniques, Therapeutic activities, Therapeutic exercises  PT Plan: Skilled PT  PT Frequency: 2 times per week  Duration: 12 weeks  Onset Date: 04/28/25  Number of Treatments Authorized: 2/39 (1 used prior)  Rehab Potential: Excellent  Plan of Care Agreement: Patient    Goals:  Active       PT Problem       PT Goal 1       Start:  06/06/25    Expected End:  08/29/25       STG  1) Patient will be able to complete all normal activities with pain no greater than 1/10 in 5 weeks.  2) Patient will be independent with HEP in 3 visits to allow for continued improvement in daily tasks at home and in the community.  3)  Patient will see an 8 degree improvement in left Dorsiflexion in order to reduce gait abnormalities and allow patient to transfer with minimal compensations in 6 weeks.     LTG  1) Patient will improve LEFS to 60/80 in order to allow for greater completion of functional activities at home and in the community in 10 weeks.  2) Patient will have 5/5 strength in left lateral ankle stabilizers to aid in balance with ambulation on varied surfaces in community in 12 weeks.   3) Patient will be able to perform  >30 seconds of left Single Leg Stance on multiple surfaces in order to allow for safe ambulation on all levels within the community in 8 weeks.            Patient Stated Goal 1       Start:  06/06/25    Expected End:  08/29/25       Be able to put all weight on ankle              Time Calculation  Start Time: 1346  Stop Time: 1431  Time Calculation (min): 45 min  PT Therapeutic Procedures Time Entry  Manual Therapy Time Entry: 20  Neuromuscular Re-Education Time Entry: 3  Therapeutic Exercise Time Entry: 20,

## 2025-06-16 ENCOUNTER — APPOINTMENT (OUTPATIENT)
Dept: PHYSICAL THERAPY | Facility: CLINIC | Age: 35
End: 2025-06-16
Payer: COMMERCIAL

## 2025-06-18 ENCOUNTER — TREATMENT (OUTPATIENT)
Dept: PHYSICAL THERAPY | Facility: CLINIC | Age: 35
End: 2025-06-18
Payer: COMMERCIAL

## 2025-06-18 DIAGNOSIS — S82.392A CLOSED FRACTURE OF POSTERIOR MALLEOLUS OF LEFT TIBIA, INITIAL ENCOUNTER: ICD-10-CM

## 2025-06-18 DIAGNOSIS — S93.432D SPRAIN OF TIBIOFIBULAR LIGAMENT OF LEFT ANKLE, SUBSEQUENT ENCOUNTER: ICD-10-CM

## 2025-06-18 DIAGNOSIS — M25.872 IMPINGEMENT OF LEFT ANKLE JOINT: ICD-10-CM

## 2025-06-18 DIAGNOSIS — S82.392D CLOSED FRACTURE OF POSTERIOR MALLEOLUS, LEFT, WITH ROUTINE HEALING, SUBSEQUENT ENCOUNTER: Primary | ICD-10-CM

## 2025-06-18 PROCEDURE — 97110 THERAPEUTIC EXERCISES: CPT | Mod: GP,CQ

## 2025-06-18 PROCEDURE — 97140 MANUAL THERAPY 1/> REGIONS: CPT | Mod: GP,CQ

## 2025-06-18 ASSESSMENT — PAIN SCALES - GENERAL: PAINLEVEL_OUTOF10: 1

## 2025-06-18 ASSESSMENT — PAIN - FUNCTIONAL ASSESSMENT: PAIN_FUNCTIONAL_ASSESSMENT: 0-10

## 2025-06-18 NOTE — PROGRESS NOTES
Physical Therapy Treatment    Patient Name: Shirley Arizmendi  MRN: [id number]  Today's Date: 6/18/2025    Current Problem  Problem List Items Addressed This Visit           ICD-10-CM    Closed fracture of posterior malleolus, left, with routine healing, subsequent encounter - Primary S82.392D    Impingement of left ankle joint M25.872     Other Visit Diagnoses         Codes      Sprain of tibiofibular ligament of left ankle, subsequent encounter     S93.432D      Closed fracture of posterior malleolus of left tibia, initial encounter     S82.392A            Insurance:  Payor: [employer] / Plan: [employer] / Product Type: *No Product type* /   Number of Treatments Authorized: 3/39 (1 used prior)          Subjective   General  Reason for Referral: L ankle pain DOS 4/28/2025  Referred By: Dr. Regis Polanco  General Comment: PT STATES HER L ANKLE IS A LITTLE SORE, BUT MAINLY TIGHT.  IT SWELLS UP AT THE END OF THE DAY.  PT WEARING SLIP ON SANDALS TODAY.    Performing HEP?: Yes    Precautions  Precautions  Precautions Comment: None  Pain  Pain Assessment: 0-10  0-10 (Numeric) Pain Score: 1  Pain Type: Surgical pain  Pain Location: Ankle  Pain Orientation: Left, Inner    Objective   General Observation  General Observation: TTP posterior tibial    Treatments:    Therapeutic Exercise  Therapeutic Exercise Activity 1: Sportsarc lvl 2 x 6 min  Therapeutic Exercise Activity 2: Gastroc stretch low level x 1 min  Therapeutic Exercise Activity 3: HEEL RAISES X 1 MIN  Therapeutic Exercise Activity 4: TG L7 SQUATS X 1 MIN    Balance/Neuromuscular Re-Education  Balance/Neuromuscular Re-Education Activity 1: TILT BOARD TAPS F/B, S/S X 2 MIN EACH // BAR  Balance/Neuromuscular Re-Education Activity 2: AIREX BEAM BALANCING WITH HEAD MOVEMENTS X 2 MIN // BAR  Balance/Neuromuscular Re-Education Activity 3: AIREX SLS L/R X 1 MIN EACH    Manual Therapy  Manual Therapy Activity 1: L ANKLE, FOOT PROM, MOBS  Manual Therapy  Activity 2: STRETCH L GASTROC,  ANT TIB, PERONEALS                        OP EDUCATION:  Outpatient Education  Education Comment: CONTINUE WITH CURRENT HEP    Assessment:  PT Assessment  Assessment Comment: PT ALVA EX'S WELL. SHE WAS CHALLENGED WITH ANKLE STRENGTHENING AND BALANCE ACTIVITIES.  PT WAS A LITTLE SORE AFTER WORKOUT AND FELT TIGHT.  PT FELT BETTER AFTER MANUAL WORK.    Plan:  OP PT Plan  Treatment/Interventions: Biofeedback, Blood flow restriction therapy, Cryotherapy, Education/ Instruction, Electrical stimulation, Gait training, Manual therapy, Neuromuscular re-education, Self care/ home management, Taping techniques, Therapeutic activities, Therapeutic exercises  PT Plan: Skilled PT  PT Frequency: 2 times per week  Duration: 12 weeks  Onset Date: 04/28/25  Number of Treatments Authorized: 3/39 (1 used prior)  Rehab Potential: Excellent  Plan of Care Agreement: Patient    Goals:  Active       PT Problem       PT Goal 1       Start:  06/06/25    Expected End:  08/29/25       STG  1) Patient will be able to complete all normal activities with pain no greater than 1/10 in 5 weeks.  2) Patient will be independent with HEP in 3 visits to allow for continued improvement in daily tasks at home and in the community.  3)  Patient will see an 8 degree improvement in left Dorsiflexion in order to reduce gait abnormalities and allow patient to transfer with minimal compensations in 6 weeks.     LTG  1) Patient will improve LEFS to 60/80 in order to allow for greater completion of functional activities at home and in the community in 10 weeks.  2) Patient will have 5/5 strength in left lateral ankle stabilizers to aid in balance with ambulation on varied surfaces in community in 12 weeks.   3) Patient will be able to perform >30 seconds of left Single Leg Stance on multiple surfaces in order to allow for safe ambulation on all levels within the community in 8 weeks.            Patient Stated Goal 1       Start:   06/06/25    Expected End:  08/29/25       Be able to put all weight on ankle              Time Calculation  Start Time: 1128  Stop Time: 1200  Time Calculation (min): 32 min  PT Therapeutic Procedures Time Entry  Therapeutic Exercise Time Entry: 10  Neuromuscular Re-Education Time Entry: 9  Manual Therapy Time Entry: 13,

## 2025-06-23 ENCOUNTER — TREATMENT (OUTPATIENT)
Dept: PHYSICAL THERAPY | Facility: CLINIC | Age: 35
End: 2025-06-23
Payer: COMMERCIAL

## 2025-06-23 DIAGNOSIS — S82.392A CLOSED FRACTURE OF POSTERIOR MALLEOLUS OF LEFT TIBIA, INITIAL ENCOUNTER: ICD-10-CM

## 2025-06-23 DIAGNOSIS — S82.392D CLOSED FRACTURE OF POSTERIOR MALLEOLUS, LEFT, WITH ROUTINE HEALING, SUBSEQUENT ENCOUNTER: Primary | ICD-10-CM

## 2025-06-23 DIAGNOSIS — S93.432D SPRAIN OF TIBIOFIBULAR LIGAMENT OF LEFT ANKLE, SUBSEQUENT ENCOUNTER: ICD-10-CM

## 2025-06-23 DIAGNOSIS — M25.872 IMPINGEMENT OF LEFT ANKLE JOINT: ICD-10-CM

## 2025-06-23 PROCEDURE — 97110 THERAPEUTIC EXERCISES: CPT | Mod: GP,CQ

## 2025-06-23 PROCEDURE — 97140 MANUAL THERAPY 1/> REGIONS: CPT | Mod: GP,CQ

## 2025-06-23 ASSESSMENT — PAIN DESCRIPTION - DESCRIPTORS: DESCRIPTORS: TIGHTNESS

## 2025-06-23 ASSESSMENT — PAIN - FUNCTIONAL ASSESSMENT: PAIN_FUNCTIONAL_ASSESSMENT: 0-10

## 2025-06-23 ASSESSMENT — PAIN SCALES - GENERAL: PAINLEVEL_OUTOF10: 1

## 2025-06-23 NOTE — PROGRESS NOTES
Physical Therapy Treatment    Patient Name: Shirley Arizmendi  MRN: [id number]  Today's Date: 6/23/2025    Current Problem  Problem List Items Addressed This Visit           ICD-10-CM    Closed fracture of posterior malleolus, left, with routine healing, subsequent encounter - Primary S82.392D    Impingement of left ankle joint M25.872     Other Visit Diagnoses         Codes      Sprain of tibiofibular ligament of left ankle, subsequent encounter     S93.432D      Closed fracture of posterior malleolus of left tibia, initial encounter     S82.392A            Insurance:  Payor: [employer] / Plan: [employer] / Product Type: *No Product type* /   Number of Treatments Authorized: 4/39 (1 used prior)          Subjective   General  Reason for Referral: L ankle pain DOS 4/28/2025  Referred By: Dr. Regis Polanco  General Comment: PT STATES HER ANKLE FEELS REALLY TIGHT TODAY.  JUST WANT IT TO CRACK.  MINIMAL SORENESS TODAY IN HER ANKLE.    Performing HEP?: Yes    Precautions  Precautions  Precautions Comment: None  Pain  Pain Assessment: 0-10  0-10 (Numeric) Pain Score: 1  Pain Location: Ankle  Pain Orientation: Left, Inner  Pain Descriptors: Tightness    Objective   General Observation  General Observation: TTP posterior tibial    Treatments:    Therapeutic Exercise  Therapeutic Exercise Activity 1: Sportsarc lvl 2 x 6 min  Therapeutic Exercise Activity 2: Gastroc stretch low level x 1 min  Therapeutic Exercise Activity 3: HEEL RAISES X 1 MIN    Balance/Neuromuscular Re-Education  Balance/Neuromuscular Re-Education Activity 1: TILT BOARD BALANCING S/S X 2 MIN // BAR  Balance/Neuromuscular Re-Education Activity 2: AIREX BEAM SIDE STEP // BAR X 2 MIN  Balance/Neuromuscular Re-Education Activity 3: AIREX SLS L/R X 1 MIN EACH    Manual Therapy  Manual Therapy Activity 1: L ANKLE, FOOT PROM, MOBS  Manual Therapy Activity 2: STRETCH L GASTROC, ANT TIB, PERONEALS                        OP EDUCATION:  Outpatient  Education  Education Comment: CONTINUE WITH CURRENT HEP    Assessment:  PT Assessment  Assessment Comment: PT ALVA EX'S WELL.  PT FATIGUES QUICKLY IN HER L ANKLE WITH BALANCING ACTIVITIES.  PT IS SLOWLY PROGRESSING WITH HER ROM AND STRENGTH.  SHE WAS SORE AFTER TODAY'S VISIT, BUT FELT LOOSER.    Plan:  OP PT Plan  Treatment/Interventions: Biofeedback, Blood flow restriction therapy, Cryotherapy, Education/ Instruction, Electrical stimulation, Gait training, Manual therapy, Neuromuscular re-education, Self care/ home management, Taping techniques, Therapeutic activities, Therapeutic exercises  PT Plan: Skilled PT  PT Frequency: 2 times per week  Duration: 12 weeks  Onset Date: 04/28/25  Number of Treatments Authorized: 4/39 (1 used prior)  Rehab Potential: Excellent  Plan of Care Agreement: Patient    Goals:  Active       PT Problem       PT Goal 1       Start:  06/06/25    Expected End:  08/29/25       STG  1) Patient will be able to complete all normal activities with pain no greater than 1/10 in 5 weeks.  2) Patient will be independent with HEP in 3 visits to allow for continued improvement in daily tasks at home and in the community.  3)  Patient will see an 8 degree improvement in left Dorsiflexion in order to reduce gait abnormalities and allow patient to transfer with minimal compensations in 6 weeks.     LTG  1) Patient will improve LEFS to 60/80 in order to allow for greater completion of functional activities at home and in the community in 10 weeks.  2) Patient will have 5/5 strength in left lateral ankle stabilizers to aid in balance with ambulation on varied surfaces in community in 12 weeks.   3) Patient will be able to perform >30 seconds of left Single Leg Stance on multiple surfaces in order to allow for safe ambulation on all levels within the community in 8 weeks.            Patient Stated Goal 1       Start:  06/06/25    Expected End:  08/29/25       Be able to put all weight on ankle               Time Calculation  Start Time: 1351  Stop Time: 1425  Time Calculation (min): 34 min  PT Therapeutic Procedures Time Entry  Therapeutic Exercise Time Entry: 8  Neuromuscular Re-Education Time Entry: 7  Manual Therapy Time Entry: 19,

## 2025-06-26 ENCOUNTER — TREATMENT (OUTPATIENT)
Dept: PHYSICAL THERAPY | Facility: CLINIC | Age: 35
End: 2025-06-26
Payer: COMMERCIAL

## 2025-06-26 DIAGNOSIS — S93.432D SPRAIN OF TIBIOFIBULAR LIGAMENT OF LEFT ANKLE, SUBSEQUENT ENCOUNTER: ICD-10-CM

## 2025-06-26 DIAGNOSIS — S82.392A CLOSED FRACTURE OF POSTERIOR MALLEOLUS OF LEFT TIBIA, INITIAL ENCOUNTER: ICD-10-CM

## 2025-06-26 DIAGNOSIS — M25.872 IMPINGEMENT OF LEFT ANKLE JOINT: ICD-10-CM

## 2025-06-26 PROCEDURE — 97112 NEUROMUSCULAR REEDUCATION: CPT | Mod: GP | Performed by: PHYSICAL THERAPIST

## 2025-06-26 PROCEDURE — 97110 THERAPEUTIC EXERCISES: CPT | Mod: GP | Performed by: PHYSICAL THERAPIST

## 2025-06-26 PROCEDURE — 97140 MANUAL THERAPY 1/> REGIONS: CPT | Mod: GP | Performed by: PHYSICAL THERAPIST

## 2025-06-26 ASSESSMENT — PAIN SCALES - GENERAL: PAINLEVEL_OUTOF10: 2

## 2025-06-26 ASSESSMENT — PAIN - FUNCTIONAL ASSESSMENT: PAIN_FUNCTIONAL_ASSESSMENT: 0-10

## 2025-06-26 NOTE — PROGRESS NOTES
Physical Therapy Treatment    Patient Name: Shirley Arizmendi  MRN: [id number]  Today's Date: 6/26/2025  Date of Surgery: 4/28/2025  Days Since Surgery: 59    Current Problem  Problem List Items Addressed This Visit           ICD-10-CM    Impingement of left ankle joint M25.872     Other Visit Diagnoses         Codes      Sprain of tibiofibular ligament of left ankle, subsequent encounter     S93.432D      Closed fracture of posterior malleolus of left tibia, initial encounter     S82.392A            Insurance:  Payor: [employer] / [facility] / Product Type: *No Product type* /   Number of Treatments Authorized: 5/39 (1 used prior)          Subjective   General  Reason for Referral: L ankle pain DOS 4/28/2025  Referred By: Dr. Regis Polanco  General Comment: Patient states that she feels her pain has been less, just with long distances. Also notes that her ankle is still more sore with walking long distances.    Performing HEP?: Yes    Precautions  Precautions  Precautions Comment: None  Pain  Pain Assessment: 0-10  0-10 (Numeric) Pain Score: 2  Pain Location: Ankle  Pain Orientation: Left    Objective   ANKLE    Ankle AROM  L ankle dorsiflexion: (10°): 7  L ankle plantarflexion: (40°): 52  L ankle inversion: (30°): 36  L ankle eversion: (20°): 10    Ankle MMT  R ankle dorsiflexion: (5/5): 5/5  L ankle dorsiflexion: (5/5): 4+/5 with pain  R ankle plantarflexion: (5/5): 5/5  L ankle plantarflexion: (5/5): 4-/5  R ankle inversion: (5/5): 5/5  L ankle inversion: (5/5): 4+/5  R ankle eversion: (5/5): 5/5  L ankle eversion: (5/5): 4/5    Treatments:    Therapeutic Exercise  Therapeutic Exercise Activity 1: Sportsarc lvl 2 x 5 min  Therapeutic Exercise Activity 2: Dynamics: march, heel tap, open gait x 2, side lunge x 2    Balance/Neuromuscular Re-Education  Balance/Neuromuscular Re-Education Activity 1: Mobo 2/4 posts taps 2 x 20  Balance/Neuromuscular Re-Education Activity 2: Mobo 2/4 posts SLS 3 x 1  min    Manual Therapy  Manual Therapy Activity 1: Grade 3 posterior talar glides  Manual Therapy Activity 2: IASTM to L gastroc and posterior tibial in prone  Manual Therapy Activity 3: STM: L gastroc, posterior tibial, anterior tibial    Assessment:  PT Assessment  Assessment Comment: Patient continues to progress well with lower extremity range of motion.  Noted improvement since initial evaluation in range and lower extremity strength.  Remains tender along posterior tibial though reduced from prior sessions and with resolution of numbness in lower extremity.  Overall progressing nicely and educated on continuing single-leg stance balance at home for greater stability.    Plan:  OP PT Plan  Treatment/Interventions: Biofeedback, Blood flow restriction therapy, Cryotherapy, Education/ Instruction, Electrical stimulation, Gait training, Manual therapy, Neuromuscular re-education, Self care/ home management, Taping techniques, Therapeutic activities, Therapeutic exercises  PT Plan: Skilled PT  PT Frequency: 2 times per week  Duration: 12 weeks  Onset Date: 04/28/25  Number of Treatments Authorized: 5/39 (1 used prior)  Rehab Potential: Excellent  Plan of Care Agreement: Patient    Goals:  Active       PT Problem       PT Goal 1       Start:  06/06/25    Expected End:  08/29/25       STG  1) Patient will be able to complete all normal activities with pain no greater than 1/10 in 5 weeks.  2) Patient will be independent with HEP in 3 visits to allow for continued improvement in daily tasks at home and in the community.  3)  Patient will see an 8 degree improvement in left Dorsiflexion in order to reduce gait abnormalities and allow patient to transfer with minimal compensations in 6 weeks.     LTG  1) Patient will improve LEFS to 60/80 in order to allow for greater completion of functional activities at home and in the community in 10 weeks.  2) Patient will have 5/5 strength in left lateral ankle stabilizers to aid in  balance with ambulation on varied surfaces in community in 12 weeks.   3) Patient will be able to perform >30 seconds of left Single Leg Stance on multiple surfaces in order to allow for safe ambulation on all levels within the community in 8 weeks.            Patient Stated Goal 1       Start:  06/06/25    Expected End:  08/29/25       Be able to put all weight on ankle              Time Calculation  Start Time: 1248  Stop Time: 1330  Time Calculation (min): 42 min  PT Therapeutic Procedures Time Entry  Therapeutic Exercise Time Entry: 8  Neuromuscular Re-Education Time Entry: 10  Manual Therapy Time Entry: 20,

## 2025-06-27 ENCOUNTER — HOSPITAL ENCOUNTER (OUTPATIENT)
Dept: RADIOLOGY | Facility: CLINIC | Age: 35
Discharge: HOME | End: 2025-06-27
Payer: COMMERCIAL

## 2025-06-27 ENCOUNTER — APPOINTMENT (OUTPATIENT)
Dept: ORTHOPEDIC SURGERY | Facility: CLINIC | Age: 35
End: 2025-06-27
Payer: COMMERCIAL

## 2025-06-27 DIAGNOSIS — S93.432A ANKLE SYNDESMOSIS DISRUPTION, LEFT, INITIAL ENCOUNTER: ICD-10-CM

## 2025-06-27 PROCEDURE — 99212 OFFICE O/P EST SF 10 MIN: CPT | Performed by: STUDENT IN AN ORGANIZED HEALTH CARE EDUCATION/TRAINING PROGRAM

## 2025-06-27 PROCEDURE — 99024 POSTOP FOLLOW-UP VISIT: CPT | Performed by: STUDENT IN AN ORGANIZED HEALTH CARE EDUCATION/TRAINING PROGRAM

## 2025-06-27 PROCEDURE — 73610 X-RAY EXAM OF ANKLE: CPT | Mod: LT

## 2025-06-27 ASSESSMENT — PAIN DESCRIPTION - DESCRIPTORS: DESCRIPTORS: ACHING;DULL;DISCOMFORT

## 2025-06-27 ASSESSMENT — PAIN SCALES - GENERAL: PAINLEVEL_OUTOF10: 3

## 2025-06-27 ASSESSMENT — PAIN - FUNCTIONAL ASSESSMENT: PAIN_FUNCTIONAL_ASSESSMENT: 0-10

## 2025-06-28 NOTE — PROGRESS NOTES
ORTHOPAEDIC SURGERY OUTPATIENT PROGRESS NOTE    Chief Complaint:  Left ankle pain    History Of Present Illness  Shirley Arizmendi is a 34 y.o. female who presents for evaluation of left ankle pain, self-referred.  Patient initially sustained an injury to her ankle from 10/30/2024 in a relatively plantarflexed position.  She was seen in an urgent care setting where imaging was obtained and she was recommended for outpatient follow-up.  Patient unfortunately persisted with pain.  She has been taking anti-inflammatories including ibuprofen.  She is currently pregnant and has 6 children at home.  She has continued to work with severe pain.  Typical pain is reported as 3 out of 10 at present but can be severe at times.  Patient reports she has a high pain threshold and is having difficulty putting full weight on her ankle due to pain.  She is scheduled for induction and several weeks as well as to begin physical therapy.    02/21/2025: Patient returns for follow-up of her left ankle pain.  She has apparently had an uncomplicated delivery.  She continues with 6 out of 10 pain in her ankle.  She has noticed some improvement in pain since giving birth but has had persistent, 6 out of 10 pain that is certainly worsened with carrying her children.  Pain is made worse with walking and standing.    05/05/2025: Patient returns s/p left ankle arthroscopy with extensive debridement and syndesmotic stabilization from 4/28/2025.  Patient is experiencing 7 out of 10 pain, compliant with nonweightbearing restrictions and elevating extremity.  She has been on hydrocodone for pain relief.  She continues to breast-feed and has on enoxaparin for DVT prophylaxis.    05/16/2025: Patient returns s/p left ankle arthroscopy with extensive debridement and syndesmotic stabilization from 4/28/2025.  Patient is currently reporting 3 out of 10 pain that is improving.  She has been putting some weight down.  She is anticipating beginning physical  therapy.    06/27/2025:  Patient returns s/p left ankle arthroscopy with extensive debridement and syndesmotic stabilization from 4/28/2025.  Patient is currently reporting 3 out of 10 pain that is improving.  She has been in physical therapy.  She does notice increased pain following physical therapy.  She is pleased with her recovery from surgery today.     Past Medical History  Past Medical History:   Diagnosis Date    Ankle syndesmosis disruption, left, initial encounter     Anxiety     Closed fracture of posterior malleolus of left tibia, initial encounter     Plan: Left Ankle Fracture Open Reduction Internal Fixation; Left Ankle Debridement 4/28/25    Depression     GERD (gastroesophageal reflux disease)     Migraines     Obesity        Surgical History  Recent Surgeries in Orthopaedic Surgery            No cases to display             Social History  Social History     Socioeconomic History    Marital status: Single   Tobacco Use    Smoking status: Never     Passive exposure: Never    Smokeless tobacco: Never   Vaping Use    Vaping status: Never Used   Substance and Sexual Activity    Alcohol use: Not Currently    Drug use: Never    Sexual activity: Defer     Social Drivers of Health     Financial Resource Strain: Low Risk  (2/2/2025)    Overall Financial Resource Strain (CARDIA)     Difficulty of Paying Living Expenses: Not hard at all   Food Insecurity: No Food Insecurity (2/2/2025)    Hunger Vital Sign     Worried About Running Out of Food in the Last Year: Never true     Ran Out of Food in the Last Year: Never true   Transportation Needs: No Transportation Needs (2/2/2025)    PRAPARE - Transportation     Lack of Transportation (Medical): No     Lack of Transportation (Non-Medical): No   Physical Activity: Sufficiently Active (2/2/2025)    Exercise Vital Sign     Days of Exercise per Week: 7 days     Minutes of Exercise per Session: 60 min   Stress: No Stress Concern Present (2/2/2025)    [employer] [place]  of Occupational Health - Occupational Stress Questionnaire     Feeling of Stress : Not at all   Social Connections: Unknown (2/2/2025)    Social Connection and Isolation Panel [NHANES]     Frequency of Communication with Friends and Family: Three times a week     Frequency of Social Gatherings with Friends and Family: Three times a week     Attends Yarsanism Services: Never     Active Member of Clubs or Organizations: No     Attends Club or Organization Meetings: Never     Marital Status: Patient declined   Intimate Partner Violence: Not At Risk (2/2/2025)    Humiliation, Afraid, Rape, and Kick questionnaire     Fear of Current or Ex-Partner: No     Emotionally Abused: No     Physically Abused: No     Sexually Abused: No       Family History  Family History   Problem Relation Name Age of Onset    Hypertension Mother Arnesta     COPD Father Anupam     Diabetes Father Anupam     Hypertension Father Anupam         Allergies  Allergies   Allergen Reactions    Latex, Natural Rubber Hives    Sulfa (Sulfonamide Antibiotics) Hives       Review of Systems  REVIEW OF SYSTEMS  Constitutional: no unplanned weight loss  Psychiatric: no suicidal ideation  ENT: no vision changes, no sinus problems  Pulmonary: no shortness of breath  Lymphatic: no enlarged lymph nodes  Cardiovascular: no chest pain or shortness of breath  Gastrointestinal: no stomach problems  Genitourinary: no dysuria   Skin: no rashes  Endocrine: no thyroid problems  Neurological: no headache, no numbness  Hematological: no easy bruising  Musculoskeletal: Left ankle pain     Physical Exam  PHYSICAL EXAMINATION  Constitutional Exam: well developed and well nourished  Psychiatric Exam: alert and oriented, appropriate mood and behavior  Eye Exam: EOMI  Pulmonary Exam: breathing non-labored, no apparent distress  Lymphatic exam: no appreciable lymphadenopathy in the lower extremities  Cardiovascular exam: RRR to peripheral palpation, DP pulses 2+, PT 2+, toes are pink  with good capillary refill, no pitting edema  Skin exam: no open lesions, rashes, abrasions or ulcerations  Neurological exam: sensation to light touch intact in both lower extremities in peripheral and dermatomal distributions (except for any abnormalities noted in musculoskeletal exam)    Musculoskeletal exam: Left lower extremity examination.  Patient anterior ankle arthroscopy incisions as well as lateral fibular and medial malleolar incisions well-healed.  She does have mild tenderness palpation overlying the lateral plate. Patient has supple and pain-free ankle range of motion without crepitus. I am able to passively range her to neutral dorsiflexion.  Sensation is grossly intact to light touch in the distal extremity with intact PF/DF/EHL and 2+ DP/PT pulses palpated.     Last Recorded Vitals  currently breastfeeding.    Laboratory Results  No results found for this or any previous visit (from the past 24 hours).     Radiology Results  X-ray imaging 3 view weightbearing left ankle reviewed and independently evaluated by me on 6/27/2025 demonstrates well aligned ankle mortise with flexible syndesmotic fixation from lateral fibular plate.  No niru-implant fracture, lucency or loosening.    Assessment/Plan:  34-year-old female s/p left ankle arthroscopy with extensive debridement and syndesmotic stabilization from 4/28/2025.  I would recommend the patient continue weightbearing to her tolerance in her left lower extremity.  She should continue in physical therapy.  I would plan to see the patient back for 6-month follow-up or sooner if symptoms would warrant. Upon return patient require three-view weightbearing left ankle.    Regis Polanco MD, [place]  Department of Orthopaedic Surgery  [facility]    The diagnosis and treatment plan were reviewed with the patient. All questions were answered. The patient verbalized understanding of the treatment plan. There were no barriers  to understanding identified.    Note dictated with PreApps software.  Completed without full type editing and sent to avoid delay.

## 2025-06-30 ENCOUNTER — TREATMENT (OUTPATIENT)
Dept: PHYSICAL THERAPY | Facility: CLINIC | Age: 35
End: 2025-06-30
Payer: COMMERCIAL

## 2025-06-30 DIAGNOSIS — S93.432D SPRAIN OF TIBIOFIBULAR LIGAMENT OF LEFT ANKLE, SUBSEQUENT ENCOUNTER: ICD-10-CM

## 2025-06-30 DIAGNOSIS — S82.392A CLOSED FRACTURE OF POSTERIOR MALLEOLUS OF LEFT TIBIA, INITIAL ENCOUNTER: ICD-10-CM

## 2025-06-30 DIAGNOSIS — S82.392D CLOSED FRACTURE OF POSTERIOR MALLEOLUS, LEFT, WITH ROUTINE HEALING, SUBSEQUENT ENCOUNTER: Primary | ICD-10-CM

## 2025-06-30 DIAGNOSIS — M25.872 IMPINGEMENT OF LEFT ANKLE JOINT: ICD-10-CM

## 2025-06-30 PROCEDURE — 97140 MANUAL THERAPY 1/> REGIONS: CPT | Mod: GP,CQ

## 2025-06-30 PROCEDURE — 97112 NEUROMUSCULAR REEDUCATION: CPT | Mod: GP,CQ

## 2025-06-30 PROCEDURE — 97110 THERAPEUTIC EXERCISES: CPT | Mod: GP,CQ

## 2025-06-30 ASSESSMENT — PAIN - FUNCTIONAL ASSESSMENT: PAIN_FUNCTIONAL_ASSESSMENT: 0-10

## 2025-06-30 ASSESSMENT — PAIN SCALES - GENERAL: PAINLEVEL_OUTOF10: 0 - NO PAIN

## 2025-06-30 NOTE — PROGRESS NOTES
Physical Therapy Treatment    Patient Name: Shirley Arizmendi  MRN: [id number]  Today's Date: 6/30/2025    Current Problem  Problem List Items Addressed This Visit           ICD-10-CM    Closed fracture of posterior malleolus, left, with routine healing, subsequent encounter - Primary S82.392D    Impingement of left ankle joint M25.872     Other Visit Diagnoses         Codes      Sprain of tibiofibular ligament of left ankle, subsequent encounter     S93.432D      Closed fracture of posterior malleolus of left tibia, initial encounter     S82.392A            Insurance:  Payor: [employer] / Plan: [employer] / Product Type: *No Product type* /   Number of Treatments Authorized: 6/39 (1 used prior)          Subjective   General  Reason for Referral: L ankle pain DOS 4/28/2025  Referred By: Dr. Regis Polanco  General Comment: PT STATES HER ANKLE IS DOING GOOD, BUT HER ALLERGIES ARE BAD AND SHE DIDN'T SLEEP WELL AT ALL LAST NIGHT.    Performing HEP?: Yes    Precautions  Precautions  Precautions Comment: None  Pain  Pain Assessment: 0-10  0-10 (Numeric) Pain Score: 0 - No pain  Pain Location: Ankle  Pain Orientation: Left    Objective   General Observation  General Observation: CHALLENGED WITH BALANCING    Treatments:    Therapeutic Exercise  Therapeutic Exercise Activity 1: Sportsarc L3 X 6 min  Therapeutic Exercise Activity 2: GASTROC STRETCH X 1 MIN  Therapeutic Exercise Activity 3: HEEL RAISES X 1 MIN  Therapeutic Exercise Activity 4: DYNAMICS MARCH, BUTT KICK, TIN SOLDIER, SIDE LUNGE, HIP FLEX  Therapeutic Exercise Activity 5: FOOTWORM YELLOW LOOP 2 X 40'    Balance/Neuromuscular Re-Education  Balance/Neuromuscular Re-Education Activity 1: AIREX SLS L/R X 1 MIN EACH  Balance/Neuromuscular Re-Education Activity 2: WOBBLE BOARD L ANKLE F/B, S/S X 1 MIN BEACH    Manual Therapy  Manual Therapy Activity 1: L ANKLE, FOOT PROM, MOBS  Manual Therapy Activity 2: STRETCH L GASTROC, ANT TIB, PERONEALS                         OP EDUCATION:  Outpatient Education  Education Comment: CONTINUE WITH CURRENT HEP    Assessment:  PT Assessment  Assessment Comment: PT ALVA EX'S WELL.  SHE WAS A LITTLE SORE AFTER PERFORMING THE ANKLE STRENGTHENING EX'S ON THE WOBBLE BOARD.  PT CONTINUES TO BE CHALLENGED WITH BALANCE ACTIVITIES.  PT IS PROGRESSING TOWARDS GOALS.    Plan:  OP PT Plan  Treatment/Interventions: Biofeedback, Blood flow restriction therapy, Cryotherapy, Education/ Instruction, Electrical stimulation, Gait training, Manual therapy, Neuromuscular re-education, Self care/ home management, Taping techniques, Therapeutic activities, Therapeutic exercises  PT Plan: Skilled PT  PT Frequency: 2 times per week  Duration: 12 weeks  Onset Date: 04/28/25  Number of Treatments Authorized: 6/39 (1 used prior)  Rehab Potential: Excellent  Plan of Care Agreement: Patient    Goals:  Active       PT Problem       PT Goal 1       Start:  06/06/25    Expected End:  08/29/25       STG  1) Patient will be able to complete all normal activities with pain no greater than 1/10 in 5 weeks.  2) Patient will be independent with HEP in 3 visits to allow for continued improvement in daily tasks at home and in the community.  3)  Patient will see an 8 degree improvement in left Dorsiflexion in order to reduce gait abnormalities and allow patient to transfer with minimal compensations in 6 weeks.     LTG  1) Patient will improve LEFS to 60/80 in order to allow for greater completion of functional activities at home and in the community in 10 weeks.  2) Patient will have 5/5 strength in left lateral ankle stabilizers to aid in balance with ambulation on varied surfaces in community in 12 weeks.   3) Patient will be able to perform >30 seconds of left Single Leg Stance on multiple surfaces in order to allow for safe ambulation on all levels within the community in 8 weeks.            Patient Stated Goal 1       Start:  06/06/25    Expected End:  08/29/25        Be able to put all weight on ankle              Time Calculation  Start Time: 1356  Stop Time: 1436  Time Calculation (min): 40 min  PT Therapeutic Procedures Time Entry  Therapeutic Exercise Time Entry: 15  Neuromuscular Re-Education Time Entry: 5  Manual Therapy Time Entry: 20,

## 2025-07-02 ENCOUNTER — APPOINTMENT (OUTPATIENT)
Dept: PHYSICAL THERAPY | Facility: CLINIC | Age: 35
End: 2025-07-02
Payer: COMMERCIAL

## 2025-07-09 ENCOUNTER — TREATMENT (OUTPATIENT)
Dept: PHYSICAL THERAPY | Facility: CLINIC | Age: 35
End: 2025-07-09
Payer: COMMERCIAL

## 2025-07-09 DIAGNOSIS — M25.872 IMPINGEMENT OF LEFT ANKLE JOINT: ICD-10-CM

## 2025-07-09 DIAGNOSIS — M25.572 LEFT ANKLE PAIN: ICD-10-CM

## 2025-07-09 DIAGNOSIS — S82.392D CLOSED FRACTURE OF POSTERIOR MALLEOLUS, LEFT, WITH ROUTINE HEALING, SUBSEQUENT ENCOUNTER: Primary | ICD-10-CM

## 2025-07-09 PROCEDURE — 97110 THERAPEUTIC EXERCISES: CPT | Mod: GP,CQ

## 2025-07-09 PROCEDURE — 97140 MANUAL THERAPY 1/> REGIONS: CPT | Mod: GP,CQ

## 2025-07-09 PROCEDURE — 97112 NEUROMUSCULAR REEDUCATION: CPT | Mod: GP,CQ

## 2025-07-09 ASSESSMENT — PAIN - FUNCTIONAL ASSESSMENT: PAIN_FUNCTIONAL_ASSESSMENT: 0-10

## 2025-07-09 ASSESSMENT — PAIN SCALES - GENERAL: PAINLEVEL_OUTOF10: 0 - NO PAIN

## 2025-07-09 NOTE — PROGRESS NOTES
Physical Therapy Treatment    Patient Name: Shirley Arizmendi  MRN: [id number]  Today's Date: 7/9/2025    Current Problem  Problem List Items Addressed This Visit           ICD-10-CM    Closed fracture of posterior malleolus, left, with routine healing, subsequent encounter - Primary S82.392D    Impingement of left ankle joint M25.872     Other Visit Diagnoses         Codes      Left ankle pain     M25.572            Insurance:  Payor: [employer] / Plan: [employer] / Product Type: *No Product type* /   Number of Treatments Authorized: 7/39 (1 used prior)          Subjective   General  Reason for Referral: L ankle pain DOS 4/28/2025  Referred By: Dr. Regis Polanco  General Comment: PT STATES GOING DOWN STEPS IS THE MOST CHALLENGING WITH STABILITY AND THE SIDE TO SIDE ROM IS STILL A LITTLE TIGHT.    Performing HEP?: Yes    Precautions  Precautions  Precautions Comment: None  Pain  Pain Assessment: 0-10  0-10 (Numeric) Pain Score: 0 - No pain  Pain Location: Ankle  Pain Orientation: Left    Objective   General Observation  General Observation: CHALLENGED WITH BALANCING    Treatments:    Therapeutic Exercise  Therapeutic Exercise Activity 1: Sportsarc L3 X 6 min  Therapeutic Exercise Activity 2: GASTROC STRETCH X 1 MIN  Therapeutic Exercise Activity 3: INCLINE HEEL RAISES X 1 MIN  Therapeutic Exercise Activity 4: DYNAMICS MARCH, BUTT KICK, TIN SOLDIER, SIDE LUNGE, HIP FLEX  Therapeutic Exercise Activity 5: FOOTWORM YELLOW LOOP 2 X 40', ZIG ZAG - MONSTER F/B X 40' EACH    Balance/Neuromuscular Re-Education  Balance/Neuromuscular Re-Education Activity 1: STAIRS DOWN / UP IN HALLWAY WITH / WITHOUT 10# KB  Balance/Neuromuscular Re-Education Activity 2: TILT BOARD BALANCING X 2 MIN  Balance/Neuromuscular Re-Education Activity 3: AIREX SLS L/R X 1 MIN    Manual Therapy  Manual Therapy Activity 1: L ANKLE, FOOT PROM, MOBS  Manual Therapy Activity 2: STRETCH L GASTROC, ANT TIB, PERONEALS                        OP  EDUCATION:  Outpatient Education  Education Comment: CONTINUE WITH CURRENT HEP    Assessment:  PT Assessment  Assessment Comment: PT ALVA EX'S WELL.  SHE WAS CHALLENGED AND FATIGUED WITH TODAY'S THER EX AND BALANCE ACTIVITIES.  PT'S L ANKLE IS PROGRESSING WELL.  SHE IS LACKING DF END ROM.  PT FELT GOOD AFTER SESSION.    Plan:  OP PT Plan  Treatment/Interventions: Biofeedback, Blood flow restriction therapy, Cryotherapy, Education/ Instruction, Electrical stimulation, Gait training, Manual therapy, Neuromuscular re-education, Self care/ home management, Taping techniques, Therapeutic activities, Therapeutic exercises  PT Plan: Skilled PT  PT Frequency: 2 times per week  Duration: 12 weeks  Onset Date: 04/28/25  Number of Treatments Authorized: 7/39 (1 used prior)  Rehab Potential: Excellent  Plan of Care Agreement: Patient    Goals:  Active       PT Problem       PT Goal 1       Start:  06/06/25    Expected End:  08/29/25       STG  1) Patient will be able to complete all normal activities with pain no greater than 1/10 in 5 weeks.  2) Patient will be independent with HEP in 3 visits to allow for continued improvement in daily tasks at home and in the community.  3)  Patient will see an 8 degree improvement in left Dorsiflexion in order to reduce gait abnormalities and allow patient to transfer with minimal compensations in 6 weeks.     LTG  1) Patient will improve LEFS to 60/80 in order to allow for greater completion of functional activities at home and in the community in 10 weeks.  2) Patient will have 5/5 strength in left lateral ankle stabilizers to aid in balance with ambulation on varied surfaces in community in 12 weeks.   3) Patient will be able to perform >30 seconds of left Single Leg Stance on multiple surfaces in order to allow for safe ambulation on all levels within the community in 8 weeks.            Patient Stated Goal 1       Start:  06/06/25    Expected End:  08/29/25       Be able to put all  weight on ankle              Time Calculation  Start Time: 1340  Stop Time: 1418  Time Calculation (min): 38 min  PT Therapeutic Procedures Time Entry  Therapeutic Exercise Time Entry: 20  Neuromuscular Re-Education Time Entry: 8  Manual Therapy Time Entry: 10,

## 2025-07-14 ENCOUNTER — APPOINTMENT (OUTPATIENT)
Dept: PHYSICAL THERAPY | Facility: CLINIC | Age: 35
End: 2025-07-14
Payer: COMMERCIAL

## 2025-07-14 DIAGNOSIS — M25.872 IMPINGEMENT OF LEFT ANKLE JOINT: ICD-10-CM

## 2025-07-17 ENCOUNTER — TREATMENT (OUTPATIENT)
Dept: PHYSICAL THERAPY | Facility: CLINIC | Age: 35
End: 2025-07-17
Payer: COMMERCIAL

## 2025-07-17 DIAGNOSIS — M25.872 IMPINGEMENT OF LEFT ANKLE JOINT: ICD-10-CM

## 2025-07-17 DIAGNOSIS — M25.572 LEFT ANKLE PAIN: ICD-10-CM

## 2025-07-17 PROCEDURE — 97110 THERAPEUTIC EXERCISES: CPT | Mod: GP | Performed by: PHYSICAL THERAPIST

## 2025-07-17 PROCEDURE — 97140 MANUAL THERAPY 1/> REGIONS: CPT | Mod: GP | Performed by: PHYSICAL THERAPIST

## 2025-07-17 PROCEDURE — 97112 NEUROMUSCULAR REEDUCATION: CPT | Mod: GP | Performed by: PHYSICAL THERAPIST

## 2025-07-17 ASSESSMENT — PAIN SCALES - GENERAL: PAINLEVEL_OUTOF10: 5 - MODERATE PAIN

## 2025-07-17 ASSESSMENT — PAIN - FUNCTIONAL ASSESSMENT: PAIN_FUNCTIONAL_ASSESSMENT: 0-10

## 2025-07-18 NOTE — PROGRESS NOTES
Physical Therapy Treatment    Patient Name: Shirley Arizmendi  MRN: [id number]  Today's Date: 7/17/2025  Date of Surgery: 4/28/2025  Days Since Surgery: 80    Current Problem  Problem List Items Addressed This Visit           ICD-10-CM    Impingement of left ankle joint M25.872     Other Visit Diagnoses         Codes      Left ankle pain     M25.572            Insurance:  Payor: [employer] / Plan: [employer] / Product Type: *No Product type* /   Number of Treatments Authorized: 8/39 (1 used prior)          Subjective   General  Reason for Referral: L ankle pain DOS 4/28/2025  Referred By: Dr. Regis Polanco  General Comment: Patient states that she slipped at work after spilling a drink and almost did the splits. Notes ankle has felt tight since.    Performing HEP?: Yes    Precautions  Precautions  Precautions Comment: None  Pain  Pain Assessment: 0-10  0-10 (Numeric) Pain Score: 5 - Moderate pain  Pain Location: Ankle  Pain Orientation: Left    Objective   TTP posterior tibial    Treatments:    Therapeutic Exercise  Therapeutic Exercise Activity 1: Sportsarc L5 X 6 min  Therapeutic Exercise Activity 2: Prone L ankle isometric EV and IV x 10 5 sec holds  Therapeutic Exercise Activity 3: Total gym lvl 7 DL squats 3 x 15  Therapeutic Exercise Activity 4: Gastroc stretch x 2 min    Balance/Neuromuscular Re-Education  Balance/Neuromuscular Re-Education Activity 1: Baps lvl 2 with opposite limb on 4in step x 3 min DF/PF, EV    Manual Therapy  Manual Therapy Activity 1: Grade 3 posterior talar glides  Manual Therapy Activity 2: IASTM to L gastroc and posterior tibial in prone  Manual Therapy Activity 3: STM: L gastroc, posterior tibial, anterior tibial    Assessment:  PT Assessment  Assessment Comment: Patient presents with elevated lower extremity pain and numbness in medial foot.  Slight improvement in pain and mobility following IASTM and soft tissue mobilization of posterior chain.  Minor difficulty with  medial pain with eversion taps as well as a standing neutral on BAP's board.  Will continue to progress lower extremity strength and mobility to reduce reaggravation of pain.    Plan:  OP PT Plan  Treatment/Interventions: Biofeedback, Blood flow restriction therapy, Cryotherapy, Education/ Instruction, Electrical stimulation, Gait training, Manual therapy, Neuromuscular re-education, Self care/ home management, Taping techniques, Therapeutic activities, Therapeutic exercises  PT Plan: Skilled PT  PT Frequency: 2 times per week  Duration: 12 weeks  Onset Date: 04/28/25  Number of Treatments Authorized: 8/39 (1 used prior)  Rehab Potential: Excellent  Plan of Care Agreement: Patient    Goals:  Active       PT Problem       PT Goal 1       Start:  06/06/25    Expected End:  08/29/25       STG  1) Patient will be able to complete all normal activities with pain no greater than 1/10 in 5 weeks.  2) Patient will be independent with HEP in 3 visits to allow for continued improvement in daily tasks at home and in the community.  3)  Patient will see an 8 degree improvement in left Dorsiflexion in order to reduce gait abnormalities and allow patient to transfer with minimal compensations in 6 weeks.     LTG  1) Patient will improve LEFS to 60/80 in order to allow for greater completion of functional activities at home and in the community in 10 weeks.  2) Patient will have 5/5 strength in left lateral ankle stabilizers to aid in balance with ambulation on varied surfaces in community in 12 weeks.   3) Patient will be able to perform >30 seconds of left Single Leg Stance on multiple surfaces in order to allow for safe ambulation on all levels within the community in 8 weeks.            Patient Stated Goal 1       Start:  06/06/25    Expected End:  08/29/25       Be able to put all weight on ankle              Time Calculation  Start Time: 1730  Stop Time: 1815  Time Calculation (min): 45 min  PT Therapeutic Procedures Time  Entry  Therapeutic Exercise Time Entry: 20  Neuromuscular Re-Education Time Entry: 7  Manual Therapy Time Entry: 15,

## 2025-07-21 ENCOUNTER — TREATMENT (OUTPATIENT)
Dept: PHYSICAL THERAPY | Facility: CLINIC | Age: 35
End: 2025-07-21
Payer: COMMERCIAL

## 2025-07-21 DIAGNOSIS — M25.572 LEFT ANKLE PAIN: ICD-10-CM

## 2025-07-21 DIAGNOSIS — M25.872 IMPINGEMENT OF LEFT ANKLE JOINT: ICD-10-CM

## 2025-07-21 PROCEDURE — 97112 NEUROMUSCULAR REEDUCATION: CPT | Mod: GP | Performed by: PHYSICAL THERAPIST

## 2025-07-21 PROCEDURE — 97140 MANUAL THERAPY 1/> REGIONS: CPT | Mod: GP | Performed by: PHYSICAL THERAPIST

## 2025-07-21 ASSESSMENT — PAIN SCALES - GENERAL: PAINLEVEL_OUTOF10: 4

## 2025-07-21 ASSESSMENT — PAIN - FUNCTIONAL ASSESSMENT: PAIN_FUNCTIONAL_ASSESSMENT: 0-10

## 2025-07-23 ENCOUNTER — APPOINTMENT (OUTPATIENT)
Dept: PHYSICAL THERAPY | Facility: CLINIC | Age: 35
End: 2025-07-23
Payer: COMMERCIAL

## 2025-07-23 DIAGNOSIS — M25.872 IMPINGEMENT OF LEFT ANKLE JOINT: ICD-10-CM

## 2025-07-29 ENCOUNTER — APPOINTMENT (OUTPATIENT)
Dept: PHYSICAL THERAPY | Facility: CLINIC | Age: 35
End: 2025-07-29
Payer: COMMERCIAL

## 2025-07-29 DIAGNOSIS — M25.872 IMPINGEMENT OF LEFT ANKLE JOINT: ICD-10-CM

## 2025-07-31 ENCOUNTER — APPOINTMENT (OUTPATIENT)
Dept: PHYSICAL THERAPY | Facility: CLINIC | Age: 35
End: 2025-07-31
Payer: COMMERCIAL

## 2025-07-31 DIAGNOSIS — M25.872 IMPINGEMENT OF LEFT ANKLE JOINT: ICD-10-CM

## 2025-10-28 ENCOUNTER — APPOINTMENT (OUTPATIENT)
Dept: GASTROENTEROLOGY | Facility: CLINIC | Age: 35
End: 2025-10-28
Payer: COMMERCIAL

## (undated) DEVICE — DRAPE COVER, C ARM, FLOUROSCAN IMAGING SYS

## (undated) DEVICE — NEEDLE, HYPODERMIC, MONOJECT, 18 G X 1.5 IN

## (undated) DEVICE — DRESSING, GAUZE, PETROLATUM, PATCH, XEROFORM, 1 X 8 IN, STERILE

## (undated) DEVICE — TUBING, PATIENT 8FT STERILE

## (undated) DEVICE — TOWEL, SURGICAL, NEURO, O/R, 16 X 26, BLUE, STERILE

## (undated) DEVICE — DRAPE, SHEET, THREE QUARTER, FAN FOLD, 57 X 77 IN

## (undated) DEVICE — WAND, ASPIRATING ABLATOR, 50 DEG, APOLLORF SJ50

## (undated) DEVICE — APPLICATOR, CHLORAPREP, W/ORANGE TINT, 26ML

## (undated) DEVICE — SUTURE, MONOCRYL, 3-0, 27 IN, PS-2, UNDYED

## (undated) DEVICE — GLOVE, SURGICAL, PROTEXIS PI BLUE W/NEUTHERA, 7.5, PF, LF

## (undated) DEVICE — Device

## (undated) DEVICE — TUBING, PUMP REDEUCE 8FT STERILE

## (undated) DEVICE — GUIDEWIRE, 1.35MM, W/ TROCAR TIP

## (undated) DEVICE — BANDAGE, ELASTIC, SELF-CLOSE, 6 IN, HONEYCOMB, STERILE

## (undated) DEVICE — BLADE, DISSECTOR, 3.0M X 7CM

## (undated) DEVICE — SUTURE, ETHILON, 3-0, 18 IN, PS2, BLACK

## (undated) DEVICE — ADAPTER, Y TUBING STERILE

## (undated) DEVICE — DRILL BIT, 2.5 MM

## (undated) DEVICE — STRAP, ANKLE, DISTRACTOR, ARTHROSCOPY, STERILE

## (undated) DEVICE — COVER, C-ARM W/CLIPS, OEC GE

## (undated) DEVICE — DRILL BIT, 2.5MM CALIBRATED

## (undated) DEVICE — BANDAGE, COFLEX, 6 X 5 YDS, FOAM TAN, STERILE, LF

## (undated) DEVICE — SUTURE, ETHILON, 3-0, 30 IN, FS-1, BLACK

## (undated) DEVICE — GLOVE, SURGICAL, PROTEXIS PI ORTHO, 7.5, PF, LF

## (undated) DEVICE — CAUTERY, PENCIL, PUSH BUTTON, SMOKE EVAC, 70MM

## (undated) DEVICE — MAT, FLOOR, SURGISAFE, FLUID CONTROL, 46X40